# Patient Record
Sex: FEMALE | Race: OTHER | NOT HISPANIC OR LATINO | URBAN - METROPOLITAN AREA
[De-identification: names, ages, dates, MRNs, and addresses within clinical notes are randomized per-mention and may not be internally consistent; named-entity substitution may affect disease eponyms.]

---

## 2020-11-17 ENCOUNTER — OUTPATIENT (OUTPATIENT)
Dept: OUTPATIENT SERVICES | Facility: HOSPITAL | Age: 30
LOS: 1 days | Discharge: HOME | End: 2020-11-17
Payer: COMMERCIAL

## 2020-11-17 DIAGNOSIS — N64.52 NIPPLE DISCHARGE: ICD-10-CM

## 2020-11-17 PROCEDURE — 76641 ULTRASOUND BREAST COMPLETE: CPT | Mod: 26,50

## 2022-08-18 ENCOUNTER — APPOINTMENT (OUTPATIENT)
Dept: HUMAN REPRODUCTION | Facility: CLINIC | Age: 32
End: 2022-08-18

## 2022-08-18 PROCEDURE — 99204 OFFICE O/P NEW MOD 45 MIN: CPT | Mod: 25

## 2022-08-18 PROCEDURE — 76830 TRANSVAGINAL US NON-OB: CPT

## 2022-08-18 PROCEDURE — 36415 COLL VENOUS BLD VENIPUNCTURE: CPT

## 2022-09-01 ENCOUNTER — TRANSCRIPTION ENCOUNTER (OUTPATIENT)
Age: 32
End: 2022-09-01

## 2022-09-13 ENCOUNTER — APPOINTMENT (OUTPATIENT)
Dept: INTERNAL MEDICINE | Facility: CLINIC | Age: 32
End: 2022-09-13

## 2022-09-13 VITALS
DIASTOLIC BLOOD PRESSURE: 58 MMHG | HEART RATE: 79 BPM | WEIGHT: 129 LBS | TEMPERATURE: 98 F | OXYGEN SATURATION: 97 % | BODY MASS INDEX: 22.86 KG/M2 | SYSTOLIC BLOOD PRESSURE: 104 MMHG | HEIGHT: 63 IN

## 2022-09-13 DIAGNOSIS — Z83.3 FAMILY HISTORY OF DIABETES MELLITUS: ICD-10-CM

## 2022-09-13 DIAGNOSIS — K21.9 GASTRO-ESOPHAGEAL REFLUX DISEASE W/OUT ESOPHAGITIS: ICD-10-CM

## 2022-09-13 DIAGNOSIS — Z80.3 FAMILY HISTORY OF MALIGNANT NEOPLASM OF BREAST: ICD-10-CM

## 2022-09-13 PROCEDURE — 99385 PREV VISIT NEW AGE 18-39: CPT | Mod: 25

## 2022-09-13 PROCEDURE — 90686 IIV4 VACC NO PRSV 0.5 ML IM: CPT

## 2022-09-13 PROCEDURE — G0008: CPT

## 2022-09-13 RX ORDER — PNV NO.95/FERROUS FUM/FOLIC AC 28MG-0.8MG
TABLET ORAL
Refills: 0 | Status: ACTIVE | COMMUNITY

## 2022-09-13 NOTE — HISTORY OF PRESENT ILLNESS
[de-identified] : Ms. HOLLI CODY is a 31 year old female with history of acid reflux presenting today for CPE. She recently moved to [place] from  and is presented today to establish care. She had COVID this spring, but is otherwise in good health and denies any other recent illness. Last month she saw a fertility specialist as she is trying to get pregnant. Labs drawn at fertility visit reviewed and unremarkable.

## 2022-09-13 NOTE — ASSESSMENT
[FreeTextEntry1] : #HCM\par - check CMP today\par - recent CBC, TSH, A1c and titers unremarkable\par - recent negative STI screening\par - Tdap up to date\par - Flu vaccine given today\par - recommend bivalent COVID booster\par - discussed diet and exercise

## 2022-09-13 NOTE — HEALTH RISK ASSESSMENT
[Never] : Never [Yes] : Yes [2 - 3 times a week (3 pts)] : 2 - 3  times a week (3 points) [1 or 2 (0 pts)] : 1 or 2 (0 points) [Never (0 pts)] : Never (0 points) [No] : In the past 12 months have you used drugs other than those required for medical reasons? No [0] : 2) Feeling down, depressed, or hopeless: Not at all (0) [PHQ-2 Negative - No further assessment needed] : PHQ-2 Negative - No further assessment needed [Patient reported PAP Smear was normal] : Patient reported PAP Smear was normal [Fully functional (bathing, dressing, toileting, transferring, walking, feeding)] : Fully functional (bathing, dressing, toileting, transferring, walking, feeding) [Fully functional (using the telephone, shopping, preparing meals, housekeeping, doing laundry, using] : Fully functional and needs no help or supervision to perform IADLs (using the telephone, shopping, preparing meals, housekeeping, doing laundry, using transportation, managing medications and managing finances) [Audit-CScore] : 3 [de-identified] : running, exercise videos  [de-identified] : healthy diet, mostly plant based  [[id number]] : 0 [Reports changes in hearing] : Reports no changes in hearing [Reports changes in vision] : Reports no changes in vision [Reports changes in dental health] : Reports no changes in dental health [PapSmearDate] : 08/22

## 2022-09-14 ENCOUNTER — TRANSCRIPTION ENCOUNTER (OUTPATIENT)
Age: 32
End: 2022-09-14

## 2022-09-14 LAB
ALBUMIN SERPL ELPH-MCNC: 5.1 G/DL
ALP BLD-CCNC: 41 U/L
ALT SERPL-CCNC: 15 U/L
ANION GAP SERPL CALC-SCNC: 11 MMOL/L
AST SERPL-CCNC: 18 U/L
BILIRUB SERPL-MCNC: 0.5 MG/DL
BUN SERPL-MCNC: 10 MG/DL
CALCIUM SERPL-MCNC: 9.7 MG/DL
CHLORIDE SERPL-SCNC: 104 MMOL/L
CO2 SERPL-SCNC: 26 MMOL/L
CREAT SERPL-MCNC: 0.71 MG/DL
EGFR: 118 ML/MIN/1.73M2
GLUCOSE SERPL-MCNC: 97 MG/DL
POTASSIUM SERPL-SCNC: 4.7 MMOL/L
PROT SERPL-MCNC: 7.5 G/DL
SODIUM SERPL-SCNC: 141 MMOL/L

## 2022-12-06 ENCOUNTER — APPOINTMENT (OUTPATIENT)
Dept: HUMAN REPRODUCTION | Facility: CLINIC | Age: 32
End: 2022-12-06

## 2022-12-06 PROCEDURE — 99214 OFFICE O/P EST MOD 30 MIN: CPT

## 2023-01-19 ENCOUNTER — APPOINTMENT (OUTPATIENT)
Dept: OBGYN | Facility: CLINIC | Age: 33
End: 2023-01-19

## 2023-02-01 ENCOUNTER — OUTPATIENT (OUTPATIENT)
Dept: OUTPATIENT SERVICES | Facility: HOSPITAL | Age: 33
LOS: 1 days | End: 2023-02-01
Payer: COMMERCIAL

## 2023-02-01 ENCOUNTER — APPOINTMENT (OUTPATIENT)
Dept: RADIOLOGY | Facility: HOSPITAL | Age: 33
End: 2023-02-01
Payer: COMMERCIAL

## 2023-02-01 PROCEDURE — 74740 X-RAY FEMALE GENITAL TRACT: CPT | Mod: 26

## 2023-02-01 PROCEDURE — 74740 X-RAY FEMALE GENITAL TRACT: CPT

## 2023-02-01 PROCEDURE — 58340 CATHETER FOR HYSTEROGRAPHY: CPT

## 2023-05-08 ENCOUNTER — APPOINTMENT (OUTPATIENT)
Dept: HUMAN REPRODUCTION | Facility: CLINIC | Age: 33
End: 2023-05-08
Payer: COMMERCIAL

## 2023-05-08 PROCEDURE — 99214 OFFICE O/P EST MOD 30 MIN: CPT | Mod: 95

## 2023-05-12 ENCOUNTER — APPOINTMENT (OUTPATIENT)
Dept: HUMAN REPRODUCTION | Facility: CLINIC | Age: 33
End: 2023-05-12
Payer: COMMERCIAL

## 2023-05-12 PROCEDURE — 36415 COLL VENOUS BLD VENIPUNCTURE: CPT

## 2023-06-22 ENCOUNTER — APPOINTMENT (OUTPATIENT)
Dept: HUMAN REPRODUCTION | Facility: CLINIC | Age: 33
End: 2023-06-22
Payer: COMMERCIAL

## 2023-06-22 PROCEDURE — 99214 OFFICE O/P EST MOD 30 MIN: CPT | Mod: 95

## 2023-07-07 ENCOUNTER — APPOINTMENT (OUTPATIENT)
Dept: HUMAN REPRODUCTION | Facility: CLINIC | Age: 33
End: 2023-07-07
Payer: COMMERCIAL

## 2023-07-07 PROCEDURE — 76830 TRANSVAGINAL US NON-OB: CPT

## 2023-07-07 PROCEDURE — 36415 COLL VENOUS BLD VENIPUNCTURE: CPT

## 2023-07-11 ENCOUNTER — APPOINTMENT (OUTPATIENT)
Dept: HUMAN REPRODUCTION | Facility: CLINIC | Age: 33
End: 2023-07-11
Payer: COMMERCIAL

## 2023-07-11 PROCEDURE — 36415 COLL VENOUS BLD VENIPUNCTURE: CPT

## 2023-07-11 PROCEDURE — 99213 OFFICE O/P EST LOW 20 MIN: CPT | Mod: 25

## 2023-07-11 PROCEDURE — 76857 US EXAM PELVIC LIMITED: CPT

## 2023-07-13 ENCOUNTER — APPOINTMENT (OUTPATIENT)
Dept: HUMAN REPRODUCTION | Facility: CLINIC | Age: 33
End: 2023-07-13
Payer: COMMERCIAL

## 2023-07-13 PROCEDURE — 36415 COLL VENOUS BLD VENIPUNCTURE: CPT

## 2023-07-13 PROCEDURE — 76857 US EXAM PELVIC LIMITED: CPT

## 2023-07-13 PROCEDURE — 99213 OFFICE O/P EST LOW 20 MIN: CPT | Mod: 25

## 2023-07-15 ENCOUNTER — APPOINTMENT (OUTPATIENT)
Dept: HUMAN REPRODUCTION | Facility: CLINIC | Age: 33
End: 2023-07-15
Payer: COMMERCIAL

## 2023-07-15 ENCOUNTER — TRANSCRIPTION ENCOUNTER (OUTPATIENT)
Age: 33
End: 2023-07-15

## 2023-07-15 PROCEDURE — 36415 COLL VENOUS BLD VENIPUNCTURE: CPT

## 2023-07-15 PROCEDURE — 76857 US EXAM PELVIC LIMITED: CPT

## 2023-07-15 PROCEDURE — 99213 OFFICE O/P EST LOW 20 MIN: CPT | Mod: 25

## 2023-07-16 ENCOUNTER — APPOINTMENT (OUTPATIENT)
Dept: HUMAN REPRODUCTION | Facility: CLINIC | Age: 33
End: 2023-07-16

## 2023-07-16 ENCOUNTER — APPOINTMENT (OUTPATIENT)
Dept: HUMAN REPRODUCTION | Facility: CLINIC | Age: 33
End: 2023-07-16
Payer: COMMERCIAL

## 2023-07-16 PROCEDURE — 76857 US EXAM PELVIC LIMITED: CPT

## 2023-07-16 PROCEDURE — 36415 COLL VENOUS BLD VENIPUNCTURE: CPT

## 2023-07-16 PROCEDURE — 99213 OFFICE O/P EST LOW 20 MIN: CPT | Mod: 25

## 2023-07-17 ENCOUNTER — APPOINTMENT (OUTPATIENT)
Dept: HUMAN REPRODUCTION | Facility: CLINIC | Age: 33
End: 2023-07-17
Payer: COMMERCIAL

## 2023-07-17 PROCEDURE — 76857 US EXAM PELVIC LIMITED: CPT

## 2023-07-17 PROCEDURE — 36415 COLL VENOUS BLD VENIPUNCTURE: CPT

## 2023-07-17 PROCEDURE — 99213 OFFICE O/P EST LOW 20 MIN: CPT | Mod: 25

## 2023-07-18 ENCOUNTER — APPOINTMENT (OUTPATIENT)
Dept: HUMAN REPRODUCTION | Facility: CLINIC | Age: 33
End: 2023-07-18
Payer: COMMERCIAL

## 2023-07-18 PROCEDURE — 36415 COLL VENOUS BLD VENIPUNCTURE: CPT

## 2023-07-19 ENCOUNTER — APPOINTMENT (OUTPATIENT)
Dept: HUMAN REPRODUCTION | Facility: CLINIC | Age: 33
End: 2023-07-19
Payer: COMMERCIAL

## 2023-07-19 PROCEDURE — 89261 SPERM ISOLATION COMPLEX: CPT

## 2023-07-19 PROCEDURE — 89250 CULTR OOCYTE/EMBRYO <4 DAYS: CPT

## 2023-07-19 PROCEDURE — 76948 ECHO GUIDE OVA ASPIRATION: CPT

## 2023-07-19 PROCEDURE — 89254 OOCYTE IDENTIFICATION: CPT

## 2023-07-19 PROCEDURE — 58970 RETRIEVAL OF OOCYTE: CPT

## 2023-07-19 PROCEDURE — 89281 ASSIST OOCYTE FERTILIZATION: CPT

## 2023-07-20 ENCOUNTER — APPOINTMENT (OUTPATIENT)
Dept: HUMAN REPRODUCTION | Facility: CLINIC | Age: 33
End: 2023-07-20
Payer: COMMERCIAL

## 2023-07-23 PROCEDURE — 89253 EMBRYO HATCHING: CPT

## 2023-07-24 PROCEDURE — 89291 BIOPSY OOCYTE POLAR BODY: CPT

## 2023-07-24 PROCEDURE — 89258 CRYOPRESERVATION EMBRYO(S): CPT

## 2023-07-24 PROCEDURE — 89272 EXTENDED CULTURE OF OOCYTES: CPT

## 2023-07-24 PROCEDURE — 89290 BIOPSY OOCYTE POLAR BODY <=5: CPT

## 2023-07-24 PROCEDURE — 89342 STORAGE/YEAR EMBRYO(S): CPT

## 2023-09-01 ENCOUNTER — APPOINTMENT (OUTPATIENT)
Dept: HUMAN REPRODUCTION | Facility: CLINIC | Age: 33
End: 2023-09-01
Payer: COMMERCIAL

## 2023-09-01 PROCEDURE — 84702 CHORIONIC GONADOTROPIN TEST: CPT

## 2023-09-01 PROCEDURE — 36415 COLL VENOUS BLD VENIPUNCTURE: CPT

## 2023-09-03 ENCOUNTER — APPOINTMENT (OUTPATIENT)
Dept: HUMAN REPRODUCTION | Facility: CLINIC | Age: 33
End: 2023-09-03

## 2023-09-05 ENCOUNTER — APPOINTMENT (OUTPATIENT)
Dept: HUMAN REPRODUCTION | Facility: CLINIC | Age: 33
End: 2023-09-05
Payer: COMMERCIAL

## 2023-09-05 PROCEDURE — 58999I: CUSTOM

## 2023-09-05 PROCEDURE — 76831 ECHO EXAM UTERUS: CPT

## 2023-09-05 PROCEDURE — 58340 CATHETER FOR HYSTEROGRAPHY: CPT

## 2023-09-05 PROCEDURE — 99213 OFFICE O/P EST LOW 20 MIN: CPT | Mod: 25

## 2023-10-09 ENCOUNTER — APPOINTMENT (OUTPATIENT)
Dept: INTERNAL MEDICINE | Facility: CLINIC | Age: 33
End: 2023-10-09
Payer: COMMERCIAL

## 2023-10-09 VITALS
WEIGHT: 133 LBS | OXYGEN SATURATION: 97 % | TEMPERATURE: 97.1 F | HEART RATE: 69 BPM | SYSTOLIC BLOOD PRESSURE: 122 MMHG | DIASTOLIC BLOOD PRESSURE: 80 MMHG | BODY MASS INDEX: 23.57 KG/M2 | HEIGHT: 63 IN

## 2023-10-09 DIAGNOSIS — Z00.00 ENCOUNTER FOR GENERAL ADULT MEDICAL EXAMINATION W/OUT ABNORMAL FINDINGS: ICD-10-CM

## 2023-10-09 DIAGNOSIS — Z11.3 ENCOUNTER FOR SCREENING FOR INFECTIONS WITH A PREDOMINANTLY SEXUAL MODE OF TRANSMISSION: ICD-10-CM

## 2023-10-09 DIAGNOSIS — B00.1 HERPESVIRAL VESICULAR DERMATITIS: ICD-10-CM

## 2023-10-09 PROCEDURE — 99395 PREV VISIT EST AGE 18-39: CPT | Mod: 25

## 2023-10-09 PROCEDURE — 36415 COLL VENOUS BLD VENIPUNCTURE: CPT

## 2023-10-09 PROCEDURE — 90686 IIV4 VACC NO PRSV 0.5 ML IM: CPT

## 2023-10-09 PROCEDURE — G0008: CPT

## 2023-10-09 RX ORDER — VALACYCLOVIR 1 G/1
1 TABLET, FILM COATED ORAL
Qty: 30 | Refills: 3 | Status: ACTIVE | COMMUNITY
Start: 1900-01-01 | End: 1900-01-01

## 2023-10-10 ENCOUNTER — TRANSCRIPTION ENCOUNTER (OUTPATIENT)
Age: 33
End: 2023-10-10

## 2023-10-10 LAB
ALBUMIN SERPL ELPH-MCNC: 5 G/DL
ALP BLD-CCNC: 45 U/L
ALT SERPL-CCNC: 18 U/L
ANION GAP SERPL CALC-SCNC: 12 MMOL/L
AST SERPL-CCNC: 22 U/L
BASOPHILS # BLD AUTO: 0.06 K/UL
BASOPHILS NFR BLD AUTO: 1.1 %
BILIRUB SERPL-MCNC: 0.4 MG/DL
BUN SERPL-MCNC: 12 MG/DL
CALCIUM SERPL-MCNC: 9.8 MG/DL
CHLORIDE SERPL-SCNC: 104 MMOL/L
CHOLEST SERPL-MCNC: 195 MG/DL
CO2 SERPL-SCNC: 24 MMOL/L
CREAT SERPL-MCNC: 0.71 MG/DL
EGFR: 117 ML/MIN/1.73M2
EOSINOPHIL # BLD AUTO: 0.13 K/UL
EOSINOPHIL NFR BLD AUTO: 2.3 %
ESTIMATED AVERAGE GLUCOSE: 94 MG/DL
GLUCOSE SERPL-MCNC: 119 MG/DL
HBA1C MFR BLD HPLC: 4.9 %
HBV CORE IGG+IGM SER QL: NONREACTIVE
HBV SURFACE AB SER QL: NONREACTIVE
HBV SURFACE AG SER QL: NONREACTIVE
HCT VFR BLD CALC: 39.4 %
HCV AB SER QL: NONREACTIVE
HCV S/CO RATIO: 0.06 S/CO
HDLC SERPL-MCNC: 76 MG/DL
HGB BLD-MCNC: 12.9 G/DL
HIV1+2 AB SPEC QL IA.RAPID: NONREACTIVE
IMM GRANULOCYTES NFR BLD AUTO: 0.2 %
LDLC SERPL CALC-MCNC: 106 MG/DL
LYMPHOCYTES # BLD AUTO: 1.81 K/UL
LYMPHOCYTES NFR BLD AUTO: 32.5 %
MAN DIFF?: NORMAL
MCHC RBC-ENTMCNC: 29.3 PG
MCHC RBC-ENTMCNC: 32.7 GM/DL
MCV RBC AUTO: 89.5 FL
MONOCYTES # BLD AUTO: 0.39 K/UL
MONOCYTES NFR BLD AUTO: 7 %
NEUTROPHILS # BLD AUTO: 3.17 K/UL
NEUTROPHILS NFR BLD AUTO: 56.9 %
NONHDLC SERPL-MCNC: 119 MG/DL
PLATELET # BLD AUTO: 288 K/UL
POTASSIUM SERPL-SCNC: 4.5 MMOL/L
PROT SERPL-MCNC: 7.7 G/DL
RBC # BLD: 4.4 M/UL
RBC # FLD: 12.6 %
SODIUM SERPL-SCNC: 139 MMOL/L
T PALLIDUM AB SER QL IA: NEGATIVE
TRIGL SERPL-MCNC: 75 MG/DL
WBC # FLD AUTO: 5.57 K/UL

## 2023-10-16 ENCOUNTER — APPOINTMENT (OUTPATIENT)
Dept: INTERNAL MEDICINE | Facility: CLINIC | Age: 33
End: 2023-10-16
Payer: COMMERCIAL

## 2023-10-16 PROCEDURE — 90471 IMMUNIZATION ADMIN: CPT

## 2023-10-16 PROCEDURE — 90746 HEPB VACCINE 3 DOSE ADULT IM: CPT

## 2023-10-27 ENCOUNTER — APPOINTMENT (OUTPATIENT)
Dept: HUMAN REPRODUCTION | Facility: CLINIC | Age: 33
End: 2023-10-27
Payer: COMMERCIAL

## 2023-10-27 PROCEDURE — 76830 TRANSVAGINAL US NON-OB: CPT

## 2023-10-27 PROCEDURE — S4042: CPT

## 2023-10-27 PROCEDURE — 36415 COLL VENOUS BLD VENIPUNCTURE: CPT

## 2023-10-27 PROCEDURE — 84702 CHORIONIC GONADOTROPIN TEST: CPT

## 2023-10-27 PROCEDURE — 82670 ASSAY OF TOTAL ESTRADIOL: CPT

## 2023-10-27 PROCEDURE — 83002 ASSAY OF GONADOTROPIN (LH): CPT | Mod: QW

## 2023-10-27 PROCEDURE — 84144 ASSAY OF PROGESTERONE: CPT

## 2023-10-27 PROCEDURE — 83001 ASSAY OF GONADOTROPIN (FSH): CPT | Mod: QW

## 2023-11-03 ENCOUNTER — RESULT REVIEW (OUTPATIENT)
Age: 33
End: 2023-11-03

## 2023-11-03 ENCOUNTER — APPOINTMENT (OUTPATIENT)
Dept: HUMAN REPRODUCTION | Facility: CLINIC | Age: 33
End: 2023-11-03
Payer: COMMERCIAL

## 2023-11-03 PROCEDURE — 99213 OFFICE O/P EST LOW 20 MIN: CPT | Mod: 25

## 2023-11-03 PROCEDURE — 82670 ASSAY OF TOTAL ESTRADIOL: CPT

## 2023-11-03 PROCEDURE — 76857 US EXAM PELVIC LIMITED: CPT

## 2023-11-03 PROCEDURE — 84144 ASSAY OF PROGESTERONE: CPT

## 2023-11-03 PROCEDURE — 36415 COLL VENOUS BLD VENIPUNCTURE: CPT

## 2023-11-03 PROCEDURE — 83002 ASSAY OF GONADOTROPIN (LH): CPT | Mod: QW

## 2023-11-10 ENCOUNTER — APPOINTMENT (OUTPATIENT)
Dept: HUMAN REPRODUCTION | Facility: CLINIC | Age: 33
End: 2023-11-10
Payer: COMMERCIAL

## 2023-11-10 PROCEDURE — 84144 ASSAY OF PROGESTERONE: CPT

## 2023-11-10 PROCEDURE — 83002 ASSAY OF GONADOTROPIN (LH): CPT | Mod: QW

## 2023-11-10 PROCEDURE — 82670 ASSAY OF TOTAL ESTRADIOL: CPT

## 2023-11-10 PROCEDURE — 99213 OFFICE O/P EST LOW 20 MIN: CPT | Mod: 25

## 2023-11-10 PROCEDURE — 76857 US EXAM PELVIC LIMITED: CPT

## 2023-11-10 PROCEDURE — 36415 COLL VENOUS BLD VENIPUNCTURE: CPT

## 2023-11-14 ENCOUNTER — APPOINTMENT (OUTPATIENT)
Dept: HUMAN REPRODUCTION | Facility: CLINIC | Age: 33
End: 2023-11-14
Payer: COMMERCIAL

## 2023-11-14 PROCEDURE — 84144 ASSAY OF PROGESTERONE: CPT

## 2023-11-14 PROCEDURE — 76857 US EXAM PELVIC LIMITED: CPT

## 2023-11-14 PROCEDURE — 82670 ASSAY OF TOTAL ESTRADIOL: CPT

## 2023-11-14 PROCEDURE — 36415 COLL VENOUS BLD VENIPUNCTURE: CPT

## 2023-11-14 PROCEDURE — 83002 ASSAY OF GONADOTROPIN (LH): CPT | Mod: QW

## 2023-11-14 PROCEDURE — 99213 OFFICE O/P EST LOW 20 MIN: CPT | Mod: 25

## 2023-11-17 ENCOUNTER — APPOINTMENT (OUTPATIENT)
Dept: HUMAN REPRODUCTION | Facility: CLINIC | Age: 33
End: 2023-11-17
Payer: COMMERCIAL

## 2023-11-17 PROCEDURE — 83002 ASSAY OF GONADOTROPIN (LH): CPT | Mod: QW

## 2023-11-17 PROCEDURE — 99213 OFFICE O/P EST LOW 20 MIN: CPT | Mod: 25

## 2023-11-17 PROCEDURE — 76857 US EXAM PELVIC LIMITED: CPT

## 2023-11-17 PROCEDURE — 82670 ASSAY OF TOTAL ESTRADIOL: CPT

## 2023-11-17 PROCEDURE — 84144 ASSAY OF PROGESTERONE: CPT

## 2023-11-17 PROCEDURE — 36415 COLL VENOUS BLD VENIPUNCTURE: CPT

## 2023-11-22 ENCOUNTER — APPOINTMENT (OUTPATIENT)
Dept: HUMAN REPRODUCTION | Facility: CLINIC | Age: 33
End: 2023-11-22
Payer: COMMERCIAL

## 2023-11-22 PROCEDURE — 89352 THAWING CRYOPRESRVED EMBRYO: CPT

## 2023-11-22 PROCEDURE — 89398A: CUSTOM

## 2023-11-22 PROCEDURE — 89255 PREPARE EMBRYO FOR TRANSFER: CPT

## 2023-11-22 PROCEDURE — 58974 EMBRYO TRANSFER INTRAUTERINE: CPT

## 2023-11-22 PROCEDURE — 84144 ASSAY OF PROGESTERONE: CPT

## 2023-11-22 PROCEDURE — 76998 US GUIDE INTRAOP: CPT

## 2023-12-01 ENCOUNTER — APPOINTMENT (OUTPATIENT)
Dept: HUMAN REPRODUCTION | Facility: CLINIC | Age: 33
End: 2023-12-01
Payer: COMMERCIAL

## 2023-12-01 PROCEDURE — 84702 CHORIONIC GONADOTROPIN TEST: CPT

## 2023-12-01 PROCEDURE — 84144 ASSAY OF PROGESTERONE: CPT

## 2023-12-03 ENCOUNTER — APPOINTMENT (OUTPATIENT)
Dept: HUMAN REPRODUCTION | Facility: CLINIC | Age: 33
End: 2023-12-03
Payer: COMMERCIAL

## 2023-12-03 PROCEDURE — 36415 COLL VENOUS BLD VENIPUNCTURE: CPT

## 2023-12-08 ENCOUNTER — APPOINTMENT (OUTPATIENT)
Dept: HUMAN REPRODUCTION | Facility: CLINIC | Age: 33
End: 2023-12-08

## 2023-12-08 ENCOUNTER — APPOINTMENT (OUTPATIENT)
Dept: HUMAN REPRODUCTION | Facility: CLINIC | Age: 33
End: 2023-12-08
Payer: COMMERCIAL

## 2023-12-08 PROCEDURE — 99213 OFFICE O/P EST LOW 20 MIN: CPT | Mod: 25

## 2023-12-08 PROCEDURE — 76817 TRANSVAGINAL US OBSTETRIC: CPT

## 2023-12-08 PROCEDURE — 36415 COLL VENOUS BLD VENIPUNCTURE: CPT

## 2023-12-22 ENCOUNTER — APPOINTMENT (OUTPATIENT)
Dept: HUMAN REPRODUCTION | Facility: CLINIC | Age: 33
End: 2023-12-22
Payer: COMMERCIAL

## 2023-12-22 ENCOUNTER — APPOINTMENT (OUTPATIENT)
Dept: HUMAN REPRODUCTION | Facility: CLINIC | Age: 33
End: 2023-12-22

## 2023-12-22 PROCEDURE — 99213 OFFICE O/P EST LOW 20 MIN: CPT | Mod: 25

## 2023-12-22 PROCEDURE — 76817 TRANSVAGINAL US OBSTETRIC: CPT

## 2023-12-27 ENCOUNTER — APPOINTMENT (OUTPATIENT)
Dept: INTERNAL MEDICINE | Facility: CLINIC | Age: 33
End: 2023-12-27
Payer: COMMERCIAL

## 2023-12-27 DIAGNOSIS — Z23 ENCOUNTER FOR IMMUNIZATION: ICD-10-CM

## 2023-12-27 PROCEDURE — 90746 HEPB VACCINE 3 DOSE ADULT IM: CPT

## 2023-12-27 PROCEDURE — G0010: CPT

## 2024-01-30 ENCOUNTER — LABORATORY RESULT (OUTPATIENT)
Age: 34
End: 2024-01-30

## 2024-01-30 ENCOUNTER — APPOINTMENT (OUTPATIENT)
Dept: ANTEPARTUM | Facility: CLINIC | Age: 34
End: 2024-01-30
Payer: COMMERCIAL

## 2024-01-30 ENCOUNTER — APPOINTMENT (OUTPATIENT)
Dept: INTERNAL MEDICINE | Facility: CLINIC | Age: 34
End: 2024-01-30

## 2024-01-30 ENCOUNTER — ASOB RESULT (OUTPATIENT)
Age: 34
End: 2024-01-30

## 2024-01-30 PROCEDURE — 76813 OB US NUCHAL MEAS 1 GEST: CPT

## 2024-01-30 PROCEDURE — 36415 COLL VENOUS BLD VENIPUNCTURE: CPT

## 2024-01-30 PROCEDURE — 93976 VASCULAR STUDY: CPT

## 2024-03-01 ENCOUNTER — ASOB RESULT (OUTPATIENT)
Age: 34
End: 2024-03-01

## 2024-03-01 ENCOUNTER — APPOINTMENT (OUTPATIENT)
Dept: ANTEPARTUM | Facility: CLINIC | Age: 34
End: 2024-03-01
Payer: COMMERCIAL

## 2024-03-01 PROCEDURE — 76817 TRANSVAGINAL US OBSTETRIC: CPT

## 2024-03-01 PROCEDURE — 76805 OB US >/= 14 WKS SNGL FETUS: CPT

## 2024-04-03 ENCOUNTER — ASOB RESULT (OUTPATIENT)
Age: 34
End: 2024-04-03

## 2024-04-03 ENCOUNTER — APPOINTMENT (OUTPATIENT)
Dept: ANTEPARTUM | Facility: CLINIC | Age: 34
End: 2024-04-03
Payer: COMMERCIAL

## 2024-04-03 PROCEDURE — 76817 TRANSVAGINAL US OBSTETRIC: CPT

## 2024-04-03 PROCEDURE — 76811 OB US DETAILED SNGL FETUS: CPT

## 2024-04-11 ENCOUNTER — APPOINTMENT (OUTPATIENT)
Dept: HEPATOLOGY | Facility: CLINIC | Age: 34
End: 2024-04-11
Payer: COMMERCIAL

## 2024-04-11 VITALS
TEMPERATURE: 98.7 F | SYSTOLIC BLOOD PRESSURE: 108 MMHG | HEART RATE: 84 BPM | DIASTOLIC BLOOD PRESSURE: 69 MMHG | BODY MASS INDEX: 24.8 KG/M2 | HEIGHT: 63 IN | OXYGEN SATURATION: 96 % | WEIGHT: 140 LBS

## 2024-04-11 PROCEDURE — 99204 OFFICE O/P NEW MOD 45 MIN: CPT

## 2024-04-13 NOTE — PHYSICAL EXAM
[General Appearance - Alert] : alert [General Appearance - Well Nourished] : well nourished [General Appearance - Well Developed] : well developed [Sclera] : the sclera and conjunctiva were normal [PERRL With Normal Accommodation] : pupils were equal in size, round, and reactive to light [Extraocular Movements] : extraocular movements were intact [] : no respiratory distress [Exaggerated Use Of Accessory Muscles For Inspiration] : no accessory muscle use [Heart Rate And Rhythm] : heart rate was normal and rhythm regular [Abdomen Soft] : soft [Abdomen Tenderness] : non-tender [Abdomen Mass (___ Cm)] : no abdominal mass palpated [Abnormal Walk] : normal gait [Oriented To Time, Place, And Person] : oriented to person, place, and time [Impaired Insight] : insight and judgment were intact [Affect] : the affect was normal [Scleral Icterus] : No Scleral Icterus [Abdominal Bruit] : no abdominal bruit [Ascites Fluid Wave] : no ascites fluid wave [Asterixis] : no asterixis observed [Jaundice] : No jaundice

## 2024-04-13 NOTE — ASSESSMENT
[FreeTextEntry1] : 33F pregnant (2nd trimester) referred for evaluation of elevated liver tests.  ALT 50 Pregnancy in general should not lead to elevated liver enzymes. She had some elements of hyperemesis in the first trimester and I am wondering if this is the tail of liver test abnormality related to first trimester hyperemesis. However, she did not have frequent vomiting.  Mild pruritus. Check serum bile acid level to rule out cholestasis of pregnancy.  She asked whether the last dose of hepatitis B vaccination can be postponed to after delivery. HBV surface antibody is already positive and we can postpone the third dose.   Follow up in 6 weeks

## 2024-04-13 NOTE — HISTORY OF PRESENT ILLNESS
[Travel to Endemic Area] : travel to an endemic area [Needlestick Exposure] : no needlestick exposure [Infected Sexual Partner] : no infected sexual partner [IV Drug Use] : no IV drug use [Hemodialysis] : no hemodialysis [Transfusion before 1992] : no transfusion before 1992 [Alcohol Abuse] : no alcohol abuse [Household Contact to HBV] : no household contact to HBV [Occupational Exposure] : no occupational exposure [Cocaine Use] : no cocaine use [de-identified] : 33F without significant PMHx, pregnancy in 2nd trimester is referred to hepatology for evaluation and treatment of newly elevated LFTs.  Labs: Prior to below - normal LFTs  1/11/2024:  CMP: Na 139, K 4.2, BUN/Cr 12/0.59, Al 4.4, AST/ALT/Alkphos 49/85/63 CBC: WBC 10, HgB 12.3, Plt 321  03/23/2024 CMP: Na 135, K 4.3, BUN/Cr 12/0.72, Al 4.1, AST/ALT/Alkphos 43/58/50  Currently on hepatitis B series: 1st) 10/2023, 2nd) 11/2023, 3rd) pending  ETOH: No use since pregnancy - social 4-5 neil a week previously Smoking: Denies.   Marijuana; Tried previously. Denies continuous use.  Supplements: Prenatal vitamins, Omega-3 fish oil daily Occupation:  - .  Recent travel to [facility] 10 days in early December 2023  Medicines: Oocyte development and release: Gonal-F, cetrotide, menopur. Multiple injections of estrogen and progesterone for stabilization of pregnancy as pt was on IVF but not taking now. Claritin 10mg Q daily for allergies Valcylovir 1000mg PRN for cold sores - last used in December  Allergies: Cat dander and dust mites. NKDA  Pt reports feeling fine aside from new developing occasional pruritus. It is generally in and around her belly and does not affect her quality of life. She denies abdominal pain, dysphagia, weight loss, black or bloody stools. She reported nausea without vomiting in the first trimester but is improving now. She also started a new series of hepatitis B vaccines as documented above right before her pregnancy. She reports development of new brief periods of pruritus, but they don't affect quality of life. She otherwise has no complaints and feels well.

## 2024-04-15 LAB
ALBUMIN SERPL ELPH-MCNC: 4.2 G/DL
ALP BLD-CCNC: 54 U/L
ALT SERPL-CCNC: 51 U/L
ANION GAP SERPL CALC-SCNC: 11 MMOL/L
AST SERPL-CCNC: 34 U/L
BILIRUB SERPL-MCNC: 0.3 MG/DL
BUN SERPL-MCNC: 10 MG/DL
CALCIUM SERPL-MCNC: 9.4 MG/DL
CHLORIDE SERPL-SCNC: 104 MMOL/L
CO2 SERPL-SCNC: 24 MMOL/L
CREAT SERPL-MCNC: 0.63 MG/DL
EGFR: 122 ML/MIN/1.73M2
GLUCOSE SERPL-MCNC: 96 MG/DL
HBV SURFACE AB SERPL IA-ACNC: 164.6 MIU/ML
HCT VFR BLD CALC: 35.4 %
HGB BLD-MCNC: 11.7 G/DL
INR PPP: 0.91 RATIO
MCHC RBC-ENTMCNC: 30.2 PG
MCHC RBC-ENTMCNC: 33.1 GM/DL
MCV RBC AUTO: 91.2 FL
PLATELET # BLD AUTO: 279 K/UL
POTASSIUM SERPL-SCNC: 4.1 MMOL/L
PROT SERPL-MCNC: 6.8 G/DL
PT BLD: 10.3 SEC
RBC # BLD: 3.88 M/UL
RBC # FLD: 14.3 %
SODIUM SERPL-SCNC: 139 MMOL/L
WBC # FLD AUTO: 9.81 K/UL

## 2024-04-17 LAB — BILE AC SER-MCNC: 5.9 UMOL/L

## 2024-04-29 ENCOUNTER — APPOINTMENT (OUTPATIENT)
Dept: INTERNAL MEDICINE | Facility: CLINIC | Age: 34
End: 2024-04-29

## 2024-05-23 ENCOUNTER — APPOINTMENT (OUTPATIENT)
Dept: HEPATOLOGY | Facility: CLINIC | Age: 34
End: 2024-05-23
Payer: COMMERCIAL

## 2024-05-23 VITALS
SYSTOLIC BLOOD PRESSURE: 102 MMHG | WEIGHT: 144 LBS | DIASTOLIC BLOOD PRESSURE: 66 MMHG | RESPIRATION RATE: 16 BRPM | HEART RATE: 80 BPM | HEIGHT: 63 IN | TEMPERATURE: 98.5 F | OXYGEN SATURATION: 95 % | BODY MASS INDEX: 25.52 KG/M2

## 2024-05-23 DIAGNOSIS — R79.89 OTHER SPECIFIED ABNORMAL FINDINGS OF BLOOD CHEMISTRY: ICD-10-CM

## 2024-05-23 PROCEDURE — 99213 OFFICE O/P EST LOW 20 MIN: CPT

## 2024-05-23 NOTE — ASSESSMENT
[FreeTextEntry1] : Elevated liver tests while pregnant No evidence of major pregnancy related liver disease  Normal bile acid level, no pruritus  Repeat labs  Reassurance given  Repeat labs in 6 weeks

## 2024-05-23 NOTE — HISTORY OF PRESENT ILLNESS
[de-identified] : She is here for follow up No new complaints Patient feels ok No new event no NVD No rectal bleeding  No chest pain or SOB  No cough No dizziness or confusion No  symptoms   ROS otherwise negative   Meds reviewed  Labs and imaging reviewed   [FreeTextEntry1] : Stable VS Clear lungs  Regular heart sounds  Pregnant  No scleral icterus  No edema

## 2024-06-06 ENCOUNTER — NON-APPOINTMENT (OUTPATIENT)
Age: 34
End: 2024-06-06

## 2024-06-06 LAB
ALBUMIN SERPL ELPH-MCNC: 4.2 G/DL
ALP BLD-CCNC: 75 U/L
ALT SERPL-CCNC: 28 U/L
ANION GAP SERPL CALC-SCNC: 11 MMOL/L
AST SERPL-CCNC: 29 U/L
BILE AC SER-MCNC: 7.8 UMOL/L
BILIRUB SERPL-MCNC: 0.3 MG/DL
BUN SERPL-MCNC: 7 MG/DL
CALCIUM SERPL-MCNC: 9.4 MG/DL
CHLORIDE SERPL-SCNC: 104 MMOL/L
CO2 SERPL-SCNC: 22 MMOL/L
CREAT SERPL-MCNC: 0.66 MG/DL
EGFR: 120 ML/MIN/1.73M2
GLUCOSE SERPL-MCNC: 98 MG/DL
HCT VFR BLD CALC: 34.7 %
HGB BLD-MCNC: 11.8 G/DL
IRON SATN MFR SERPL: 21 %
IRON SERPL-MCNC: 90 UG/DL
MCHC RBC-ENTMCNC: 30.6 PG
MCHC RBC-ENTMCNC: 34 GM/DL
MCV RBC AUTO: 89.9 FL
PLATELET # BLD AUTO: 260 K/UL
POTASSIUM SERPL-SCNC: 4.7 MMOL/L
PROT SERPL-MCNC: 6.6 G/DL
RBC # BLD: 3.86 M/UL
RBC # FLD: 13.3 %
SODIUM SERPL-SCNC: 137 MMOL/L
TIBC SERPL-MCNC: 426 UG/DL
TTG IGA SER IA-ACNC: <1.2 U/ML
TTG IGA SER-ACNC: NEGATIVE
TTG IGG SER IA-ACNC: 2 U/ML
TTG IGG SER IA-ACNC: NEGATIVE
UIBC SERPL-MCNC: 336 UG/DL
WBC # FLD AUTO: 9.19 K/UL

## 2024-06-11 ENCOUNTER — ASOB RESULT (OUTPATIENT)
Age: 34
End: 2024-06-11

## 2024-06-11 ENCOUNTER — APPOINTMENT (OUTPATIENT)
Dept: ANTEPARTUM | Facility: CLINIC | Age: 34
End: 2024-06-11
Payer: COMMERCIAL

## 2024-06-11 PROCEDURE — 76816 OB US FOLLOW-UP PER FETUS: CPT

## 2024-06-11 PROCEDURE — 76820 UMBILICAL ARTERY ECHO: CPT | Mod: 59

## 2024-06-11 PROCEDURE — 76819 FETAL BIOPHYS PROFIL W/O NST: CPT

## 2024-07-08 ENCOUNTER — APPOINTMENT (OUTPATIENT)
Dept: HEPATOLOGY | Facility: CLINIC | Age: 34
End: 2024-07-08

## 2024-07-08 ENCOUNTER — APPOINTMENT (OUTPATIENT)
Dept: ANTEPARTUM | Facility: CLINIC | Age: 34
End: 2024-07-08
Payer: COMMERCIAL

## 2024-07-08 ENCOUNTER — ASOB RESULT (OUTPATIENT)
Age: 34
End: 2024-07-08

## 2024-07-08 PROCEDURE — 76816 OB US FOLLOW-UP PER FETUS: CPT

## 2024-07-08 PROCEDURE — 76819 FETAL BIOPHYS PROFIL W/O NST: CPT

## 2024-07-08 PROCEDURE — 76820 UMBILICAL ARTERY ECHO: CPT | Mod: 59

## 2024-07-30 ENCOUNTER — OUTPATIENT (OUTPATIENT)
Dept: OUTPATIENT SERVICES | Facility: HOSPITAL | Age: 34
LOS: 1 days | End: 2024-07-30
Payer: COMMERCIAL

## 2024-07-30 VITALS
SYSTOLIC BLOOD PRESSURE: 115 MMHG | HEART RATE: 95 BPM | RESPIRATION RATE: 18 BRPM | TEMPERATURE: 98 F | DIASTOLIC BLOOD PRESSURE: 79 MMHG

## 2024-07-30 DIAGNOSIS — Z90.89 ACQUIRED ABSENCE OF OTHER ORGANS: Chronic | ICD-10-CM

## 2024-07-30 DIAGNOSIS — O26.899 OTHER SPECIFIED PREGNANCY RELATED CONDITIONS, UNSPECIFIED TRIMESTER: ICD-10-CM

## 2024-07-30 LAB
ALBUMIN SERPL ELPH-MCNC: 3.8 G/DL — SIGNIFICANT CHANGE UP (ref 3.3–5)
ALP SERPL-CCNC: 166 U/L — HIGH (ref 40–120)
ALT FLD-CCNC: 23 U/L — SIGNIFICANT CHANGE UP (ref 10–45)
ANION GAP SERPL CALC-SCNC: 10 MMOL/L — SIGNIFICANT CHANGE UP (ref 5–17)
APPEARANCE UR: CLEAR — SIGNIFICANT CHANGE UP
APTT BLD: 25.4 SEC — SIGNIFICANT CHANGE UP (ref 24.5–35.6)
AST SERPL-CCNC: 30 U/L — SIGNIFICANT CHANGE UP (ref 10–40)
BACTERIA # UR AUTO: NEGATIVE /HPF — SIGNIFICANT CHANGE UP
BASOPHILS # BLD AUTO: 0.04 K/UL — SIGNIFICANT CHANGE UP (ref 0–0.2)
BASOPHILS NFR BLD AUTO: 0.4 % — SIGNIFICANT CHANGE UP (ref 0–2)
BILIRUB SERPL-MCNC: 0.4 MG/DL — SIGNIFICANT CHANGE UP (ref 0.2–1.2)
BILIRUB UR-MCNC: NEGATIVE — SIGNIFICANT CHANGE UP
BUN SERPL-MCNC: 6 MG/DL — LOW (ref 7–23)
CALCIUM SERPL-MCNC: 9.1 MG/DL — SIGNIFICANT CHANGE UP (ref 8.4–10.5)
CAST: 0 /LPF — SIGNIFICANT CHANGE UP (ref 0–4)
CHLORIDE SERPL-SCNC: 103 MMOL/L — SIGNIFICANT CHANGE UP (ref 96–108)
CO2 SERPL-SCNC: 21 MMOL/L — LOW (ref 22–31)
COLOR SPEC: YELLOW — SIGNIFICANT CHANGE UP
CREAT ?TM UR-MCNC: 45 MG/DL — SIGNIFICANT CHANGE UP
CREAT SERPL-MCNC: 0.62 MG/DL — SIGNIFICANT CHANGE UP (ref 0.5–1.3)
DIFF PNL FLD: NEGATIVE — SIGNIFICANT CHANGE UP
EGFR: 122 ML/MIN/1.73M2 — SIGNIFICANT CHANGE UP
EOSINOPHIL # BLD AUTO: 0.06 K/UL — SIGNIFICANT CHANGE UP (ref 0–0.5)
EOSINOPHIL NFR BLD AUTO: 0.5 % — SIGNIFICANT CHANGE UP (ref 0–6)
FIBRINOGEN PPP-MCNC: 474 MG/DL — HIGH (ref 200–445)
GLUCOSE SERPL-MCNC: 91 MG/DL — SIGNIFICANT CHANGE UP (ref 70–99)
GLUCOSE UR QL: NEGATIVE MG/DL — SIGNIFICANT CHANGE UP
HCT VFR BLD CALC: 33.6 % — LOW (ref 34.5–45)
HGB BLD-MCNC: 11.9 G/DL — SIGNIFICANT CHANGE UP (ref 11.5–15.5)
IMM GRANULOCYTES NFR BLD AUTO: 0.5 % — SIGNIFICANT CHANGE UP (ref 0–0.9)
INR BLD: 0.84 — LOW (ref 0.85–1.18)
KETONES UR-MCNC: NEGATIVE MG/DL — SIGNIFICANT CHANGE UP
LDH SERPL L TO P-CCNC: 191 U/L — SIGNIFICANT CHANGE UP (ref 50–242)
LEUKOCYTE ESTERASE UR-ACNC: ABNORMAL
LYMPHOCYTES # BLD AUTO: 1.91 K/UL — SIGNIFICANT CHANGE UP (ref 1–3.3)
LYMPHOCYTES # BLD AUTO: 17.2 % — SIGNIFICANT CHANGE UP (ref 13–44)
MCHC RBC-ENTMCNC: 30.7 PG — SIGNIFICANT CHANGE UP (ref 27–34)
MCHC RBC-ENTMCNC: 35.4 GM/DL — SIGNIFICANT CHANGE UP (ref 32–36)
MCV RBC AUTO: 86.8 FL — SIGNIFICANT CHANGE UP (ref 80–100)
MONOCYTES # BLD AUTO: 0.8 K/UL — SIGNIFICANT CHANGE UP (ref 0–0.9)
MONOCYTES NFR BLD AUTO: 7.2 % — SIGNIFICANT CHANGE UP (ref 2–14)
NEUTROPHILS # BLD AUTO: 8.26 K/UL — HIGH (ref 1.8–7.4)
NEUTROPHILS NFR BLD AUTO: 74.2 % — SIGNIFICANT CHANGE UP (ref 43–77)
NITRITE UR-MCNC: NEGATIVE — SIGNIFICANT CHANGE UP
NRBC # BLD: 0 /100 WBCS — SIGNIFICANT CHANGE UP (ref 0–0)
PH UR: 7.5 — SIGNIFICANT CHANGE UP (ref 5–8)
PLATELET # BLD AUTO: 214 K/UL — SIGNIFICANT CHANGE UP (ref 150–400)
POTASSIUM SERPL-MCNC: 3.7 MMOL/L — SIGNIFICANT CHANGE UP (ref 3.5–5.3)
POTASSIUM SERPL-SCNC: 3.7 MMOL/L — SIGNIFICANT CHANGE UP (ref 3.5–5.3)
PROT ?TM UR-MCNC: 8 MG/DL — SIGNIFICANT CHANGE UP (ref 0–12)
PROT SERPL-MCNC: 6.9 G/DL — SIGNIFICANT CHANGE UP (ref 6–8.3)
PROT UR-MCNC: NEGATIVE MG/DL — SIGNIFICANT CHANGE UP
PROT/CREAT UR-RTO: 0.2 RATIO — SIGNIFICANT CHANGE UP (ref 0–0.2)
PROTHROM AB SERPL-ACNC: 9.6 SEC — SIGNIFICANT CHANGE UP (ref 9.5–13)
RBC # BLD: 3.87 M/UL — SIGNIFICANT CHANGE UP (ref 3.8–5.2)
RBC # FLD: 13.4 % — SIGNIFICANT CHANGE UP (ref 10.3–14.5)
RBC CASTS # UR COMP ASSIST: 0 /HPF — SIGNIFICANT CHANGE UP (ref 0–4)
SODIUM SERPL-SCNC: 134 MMOL/L — LOW (ref 135–145)
SP GR SPEC: 1.01 — SIGNIFICANT CHANGE UP (ref 1–1.03)
SQUAMOUS # UR AUTO: 1 /HPF — SIGNIFICANT CHANGE UP (ref 0–5)
URATE SERPL-MCNC: 3.9 MG/DL — SIGNIFICANT CHANGE UP (ref 2.5–7)
UROBILINOGEN FLD QL: 0.2 MG/DL — SIGNIFICANT CHANGE UP (ref 0.2–1)
WBC # BLD: 11.13 K/UL — HIGH (ref 3.8–10.5)
WBC # FLD AUTO: 11.13 K/UL — HIGH (ref 3.8–10.5)
WBC UR QL: 0 /HPF — SIGNIFICANT CHANGE UP (ref 0–5)

## 2024-07-30 PROCEDURE — 59025 FETAL NON-STRESS TEST: CPT | Mod: 26,59

## 2024-07-30 PROCEDURE — 85730 THROMBOPLASTIN TIME PARTIAL: CPT

## 2024-07-30 PROCEDURE — 76815 OB US LIMITED FETUS(S): CPT | Mod: 26

## 2024-07-30 PROCEDURE — 99221 1ST HOSP IP/OBS SF/LOW 40: CPT | Mod: 25

## 2024-07-30 PROCEDURE — 76815 OB US LIMITED FETUS(S): CPT

## 2024-07-30 PROCEDURE — 76818 FETAL BIOPHYS PROFILE W/NST: CPT | Mod: 26

## 2024-07-30 PROCEDURE — 81001 URINALYSIS AUTO W/SCOPE: CPT

## 2024-07-30 PROCEDURE — 82570 ASSAY OF URINE CREATININE: CPT

## 2024-07-30 PROCEDURE — 84156 ASSAY OF PROTEIN URINE: CPT

## 2024-07-30 PROCEDURE — 85025 COMPLETE CBC W/AUTO DIFF WBC: CPT

## 2024-07-30 PROCEDURE — 99214 OFFICE O/P EST MOD 30 MIN: CPT

## 2024-07-30 PROCEDURE — 76818 FETAL BIOPHYS PROFILE W/NST: CPT

## 2024-07-30 PROCEDURE — 84550 ASSAY OF BLOOD/URIC ACID: CPT

## 2024-07-30 PROCEDURE — 59025 FETAL NON-STRESS TEST: CPT

## 2024-07-30 PROCEDURE — 36415 COLL VENOUS BLD VENIPUNCTURE: CPT

## 2024-07-30 PROCEDURE — 85610 PROTHROMBIN TIME: CPT

## 2024-07-30 PROCEDURE — 80053 COMPREHEN METABOLIC PANEL: CPT

## 2024-07-30 PROCEDURE — 83615 LACTATE (LD) (LDH) ENZYME: CPT

## 2024-07-30 PROCEDURE — 85384 FIBRINOGEN ACTIVITY: CPT

## 2024-07-30 NOTE — OB RN TRIAGE NOTE - FALL HARM RISK - UNIVERSAL INTERVENTIONS
Bed in lowest position, wheels locked, appropriate side rails in place/Call bell, personal items and telephone in reach/Instruct patient to call for assistance before getting out of bed or chair/Non-slip footwear when patient is out of bed/[place] to call system/Physically safe environment - no spills, clutter or unnecessary equipment/Purposeful Proactive Rounding/Room/bathroom lighting operational, light cord in reach

## 2024-07-30 NOTE — OB PROVIDER TRIAGE NOTE - HISTORY OF PRESENT ILLNESS
HPI: 32 yo  at 38w4d presenting from the office with an elevated BP of 150/85 at 15:00 today. Patient states that this is her first episode of elevated blood pressures during this pregnancy and has never completed a 24 hr urine. Endorses FM. Denies VB, LOF, ctx, visual changes, HA, RUQ pain, SOB/CP, or worsening b/l LE edema.    ANTE: IVF pregnancy, own egg. First mild range BP today . NIPT and anatomy scan WNL. GCT passed. GBS negative/positive. Denies thyroid disorders. EFW 3175g today in office.    OB: G1 - SAB. G2 - current.  GynHx: Uterine fibroid 1.1x0.8x1.15cm. Denies ovarian cysts, STI's, or abnormal PAP.  PMHx: Denies  PSurgHx: Denies  Medications: PNV  ALL: NKDA    BP:   HR:  Gen: NAD, A&Ox3  Abd: non-tender, gravid  LE: no swelling or pitting edema  Skin: no excoriations or rashes  Pulm: no increased WOB  SVE:    FHT: Cat 1,  bpm, moderate variability, + accels, - decels.  [place]: Tameka q  TAUS: Cephalic, anterior/posterior placenta. U/S printed and attached to paper chart.     A/P: 32 yo  at 38w4d presenting for r/o gHTN vs PEC w/o SF.   - BPP  - NST reactive and reassuring  - Full labs pending  - Monitor BP's q15 minutes HPI: 34 yo  at 38w4d presenting from the office with an elevated BP of 150/85 at 15:00 today. Patient states that this is her first episode of elevated blood pressures during this pregnancy and has never completed a 24 hr urine. She admits to a 3/10 frontal HA this morning that was relived on it's own, did not take Tylenol for relief. Endorses FM. Denies VB, LOF, ctx, visual changes, RUQ pain, SOB/CP, or worsening b/l LE edema.    ANTE: IVF pregnancy, own egg. First mild range BP today 150/85 on . NIPT and anatomy scan WNL. GCT passed. GBS negative. Denies thyroid disorders. EFW 3175g today in office.    OB: G1 - SAB , G2 - SAB . G2 - current.  GynHx: Uterine fibroid 1.1x0.8x1.15cm. Denies ovarian cysts, STI's, or abnormal PAP.  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  [place]: Tameka q  TAUS: Breech, anterior/posterior placenta. U/S printed and attached to paper chart.     A/P: 34 yo  at 38w4d presenting for r/o gHTN vs PEC w/o SF.   - BPP  - NST reactive and reassuring  - Full labs pending  - Monitor BP's q15 minutes HPI: 34 yo  at 38w4d presenting from the office with an elevated BP of 150/85 at 15:00 today. Patient states that this is her first episode of elevated blood pressures during this pregnancy and has never completed a 24 hr urine. She admits to a 3/10 frontal HA this morning that was relived on it's own, did not take Tylenol for relief. Endorses FM. Denies VB, LOF, ctx, visual changes, RUQ pain, SOB/CP, or worsening b/l LE edema.    ANTE: IVF pregnancy, own egg. First mild range BP today 150/85 on . NIPT and anatomy scan WNL. GCT passed. GBS negative. Denies thyroid disorders. EFW 3175g today in office.    OB: G1 - SAB , G2 - SAB . G2 - current.  GynHx: Uterine fibroid 1.1x0.8x1.15cm. Denies ovarian cysts, STI's, or abnormal PAP.  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  [place]: Tameka 1 in 10 minutes on toco.  TAUS: Breech, spine to maternal left, posterior placenta. BPP 8/8, TONE 11 cm.     A/P: 34 yo  at 38w4d presenting for r/o gHTN vs PEC w/o SF.   - BPP 8/8  - NST reactive and reassuring, a single variable vs late decel noted on first triage assessment, no toco at the time, will continue to monitor  - Full labs pending  - Monitor BP's q15 minutes HPI: 34 yo  at 38w4d presenting from the office with an elevated BP of 150/85 at 15:00 today. Patient states that this is her first episode of elevated blood pressures during this pregnancy and has never completed a 24 hr urine. She admits to a 3/10 frontal HA this morning that was relived on it's own, did not take Tylenol for relief. Endorses FM. Denies VB, LOF, ctx, visual changes, RUQ pain, SOB/CP, or worsening b/l LE edema.    ANTE: IVF pregnancy, own egg. First mild range BP today 150/85 on . NIPT and anatomy scan WNL. GCT passed. GBS negative. Denies thyroid disorders. EFW 3175g today in office.    OB: G1 - SAB , G2 - SAB . G2 - current.  GynHx: Uterine fibroid 1.1x0.8x1.15cm. Denies ovarian cysts, STI's, or abnormal PAP.  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  [place]: Tameka 1 in 10 minutes on toco, pt does not feel them  TAUS: Breech, spine to maternal left, posterior placenta. BPP 8/8, TONE 11 cm.     A/P: 34 yo  at 38w4d presenting for r/o gHTN vs PEC w/o SF.   - BPP 8/8  - NST reactive and reassuring, a single variable vs late decel noted on first triage assessment, no toco at the time, will continue to monitor  - Full labs pending  - Monitor BP's q15 minutes HPI: 34 yo  at 38w4d presenting from the office with an elevated BP of 150/85 at 15:00 today. Patient states that this is her first episode of elevated blood pressures during this pregnancy and has never completed a 24 hr urine. She admits to a 3/10 frontal HA this morning that was relived on it's own, did not take Tylenol for relief. Endorses FM. Denies VB, LOF, ctx, visual changes, RUQ pain, SOB/CP, or worsening b/l LE edema.    ANTE: IVF pregnancy, own egg. First mild range BP today 150/85 on . NIPT and anatomy scan WNL. GCT passed. GBS negative. Denies thyroid disorders. EFW 3175g today in office.    OB: G1 - SAB , G2 - SAB . G2 - current.  GynHx: Uterine fibroid 1.1x0.8x1.15cm. Denies ovarian cysts, STI's, or abnormal PAP.  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  [place]: Tameka 1 in 10 minutes on toco, pt does not feel them  TAUS: Breech, spine to maternal left, posterior placenta. BPP 8/8, TONE 11 cm.     A/P: 34 yo  at 38w4d presenting for r/o gHTN vs PEC w/o SF.   - BPP 8/8  - NST reactive and reassuring, a single variable vs late decel noted on first triage assessment, no toco at the time, will continue to monitor  - Full labs: P:C 0.2, Cr: 0.62, AST/ALT 30/, plt 214  - Monitor BP's q15 minutes, normotensive  - Plan: Continued FHT reactive and reassuring with accels and moderate variability. Vitals are stable, patient is normotensive. Labs are back and WNL. Patient denies toxic complaints at this time, SVE 1/long. Patient ruled out for EL and for gHTN at this time. Has a scheduled c/s for breech presentation on . Return precautions discussed including but not limited to VB, LOF, ctx, decreased FM, HA, visual changes, RUQ pain, CP, or SOB.  HPI: 32 yo  at 38w4d presenting from the office with an elevated BP of 150/85 at 15:00 today. Patient states that this is her first episode of elevated blood pressures during this pregnancy and has never completed a 24 hr urine. She admits to a 3/10 frontal HA this morning that was relived on it's own, did not take Tylenol for relief. Endorses FM. Denies VB, LOF, ctx, visual changes, RUQ pain, SOB/CP, or worsening b/l LE edema.    ANTE: IVF pregnancy, own egg. First mild range BP today 150/85 on . NIPT and anatomy scan WNL. GCT passed. GBS negative. Denies thyroid disorders. EFW 3175g today in office.    OB: G1 - SAB , G2 - SAB . G2 - current.  GynHx: Uterine fibroid 1.1x0.8x1.15cm. Denies ovarian cysts, STI's, or abnormal PAP.  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  [place]: Tameka 1 in 10 minutes on toco, pt does not feel them  TAUS: Breech, spine to maternal left, posterior placenta. BPP 8/8, TONE 11 cm. U/S printed and attached to paper chart.    A/P: 32 yo  at 38w4d presenting for r/o gHTN vs PEC w/o SF.   - BPP 8/8  - NST reactive and reassuring, a single variable vs late decel noted on first triage assessment, no toco at the time, will continue to monitor  - Full labs: P:C 0.2, Cr: 0.62, AST/ALT 30/23, plt 214  - Monitor BP's q15 minutes, normotensive  - Plan: Continued FHT reactive and reassuring with accels and moderate variability. Vitals are stable, patient is normotensive. Labs are back and WNL. Patient denies toxic complaints at this time, SVE 1/long. Patient ruled out for EL and for gHTN at this time. Has a scheduled c/s for breech presentation on . Return precautions discussed including but not limited to VB, LOF, ctx, decreased FM, HA, visual changes, RUQ pain, CP, or SOB.     D/W Dr. Paul Acosta PA-C

## 2024-07-30 NOTE — OB RN TRIAGE NOTE - LIVING CHILDREN, OB PROFILE
01/18/21        Scott Wegener  [address]   [facility] [place] [address]      Dear Mark Tristan,    [facility] records indicate that you have outstanding lab work and or testing that was ordered for you and has not yet been completed:  Fasting Lab Work   To keep our p 0

## 2024-08-01 ENCOUNTER — TRANSCRIPTION ENCOUNTER (OUTPATIENT)
Age: 34
End: 2024-08-01

## 2024-08-01 DIAGNOSIS — O09.813 SUPERVISION OF PREGNANCY RESULTING FROM ASSISTED REPRODUCTIVE TECHNOLOGY, THIRD TRIMESTER: ICD-10-CM

## 2024-08-01 DIAGNOSIS — D25.9 LEIOMYOMA OF UTERUS, UNSPECIFIED: ICD-10-CM

## 2024-08-01 DIAGNOSIS — O09.293 SUPERVISION OF PREGNANCY WITH OTHER POOR REPRODUCTIVE OR OBSTETRIC HISTORY, THIRD TRIMESTER: ICD-10-CM

## 2024-08-01 DIAGNOSIS — O34.13 MATERNAL CARE FOR BENIGN TUMOR OF CORPUS UTERI, THIRD TRIMESTER: ICD-10-CM

## 2024-08-01 DIAGNOSIS — Z3A.38 38 WEEKS GESTATION OF PREGNANCY: ICD-10-CM

## 2024-08-01 DIAGNOSIS — O16.3 UNSPECIFIED MATERNAL HYPERTENSION, THIRD TRIMESTER: ICD-10-CM

## 2024-08-02 ENCOUNTER — INPATIENT (INPATIENT)
Facility: HOSPITAL | Age: 34
LOS: 1 days | Discharge: HOME | DRG: 833 | End: 2024-08-04
Attending: OBSTETRICS & GYNECOLOGY | Admitting: OBSTETRICS & GYNECOLOGY
Payer: COMMERCIAL

## 2024-08-02 VITALS
SYSTOLIC BLOOD PRESSURE: 124 MMHG | DIASTOLIC BLOOD PRESSURE: 80 MMHG | HEART RATE: 97 BPM | OXYGEN SATURATION: 94 % | TEMPERATURE: 98 F | RESPIRATION RATE: 16 BRPM

## 2024-08-02 DIAGNOSIS — Z90.89 ACQUIRED ABSENCE OF OTHER ORGANS: Chronic | ICD-10-CM

## 2024-08-02 DIAGNOSIS — O26.899 OTHER SPECIFIED PREGNANCY RELATED CONDITIONS, UNSPECIFIED TRIMESTER: ICD-10-CM

## 2024-08-02 PROBLEM — Z78.9 OTHER SPECIFIED HEALTH STATUS: Chronic | Status: ACTIVE | Noted: 2024-07-30

## 2024-08-02 LAB
BASOPHILS # BLD AUTO: 0.05 K/UL — SIGNIFICANT CHANGE UP (ref 0–0.2)
BASOPHILS NFR BLD AUTO: 0.4 % — SIGNIFICANT CHANGE UP (ref 0–2)
BLD GP AB SCN SERPL QL: POSITIVE — SIGNIFICANT CHANGE UP
EOSINOPHIL # BLD AUTO: 0.07 K/UL — SIGNIFICANT CHANGE UP (ref 0–0.5)
EOSINOPHIL NFR BLD AUTO: 0.5 % — SIGNIFICANT CHANGE UP (ref 0–6)
HCT VFR BLD CALC: 36.7 % — SIGNIFICANT CHANGE UP (ref 34.5–45)
HGB BLD-MCNC: 12.6 G/DL — SIGNIFICANT CHANGE UP (ref 11.5–15.5)
IMM GRANULOCYTES NFR BLD AUTO: 0.6 % — SIGNIFICANT CHANGE UP (ref 0–0.9)
KLEIHAUER-BETKE CALCULATION: 0 % — SIGNIFICANT CHANGE UP (ref 0–0.2)
LYMPHOCYTES # BLD AUTO: 19.3 % — SIGNIFICANT CHANGE UP (ref 13–44)
LYMPHOCYTES # BLD AUTO: 2.54 K/UL — SIGNIFICANT CHANGE UP (ref 1–3.3)
MCHC RBC-ENTMCNC: 30.2 PG — SIGNIFICANT CHANGE UP (ref 27–34)
MCHC RBC-ENTMCNC: 34.3 GM/DL — SIGNIFICANT CHANGE UP (ref 32–36)
MCV RBC AUTO: 88 FL — SIGNIFICANT CHANGE UP (ref 80–100)
MONOCYTES # BLD AUTO: 1.02 K/UL — HIGH (ref 0–0.9)
MONOCYTES NFR BLD AUTO: 7.8 % — SIGNIFICANT CHANGE UP (ref 2–14)
NEUTROPHILS # BLD AUTO: 9.39 K/UL — HIGH (ref 1.8–7.4)
NEUTROPHILS NFR BLD AUTO: 71.4 % — SIGNIFICANT CHANGE UP (ref 43–77)
NRBC # BLD: 0 /100 WBCS — SIGNIFICANT CHANGE UP (ref 0–0)
PLATELET # BLD AUTO: 219 K/UL — SIGNIFICANT CHANGE UP (ref 150–400)
RBC # BLD: 4.17 M/UL — SIGNIFICANT CHANGE UP (ref 3.8–5.2)
RBC # FLD: 13.2 % — SIGNIFICANT CHANGE UP (ref 10.3–14.5)
RH IG SCN BLD-IMP: NEGATIVE — SIGNIFICANT CHANGE UP
RH IG SCN BLD-IMP: NEGATIVE — SIGNIFICANT CHANGE UP
T PALLIDUM AB TITR SER: NEGATIVE — SIGNIFICANT CHANGE UP
WBC # BLD: 13.15 K/UL — HIGH (ref 3.8–10.5)
WBC # FLD AUTO: 13.15 K/UL — HIGH (ref 3.8–10.5)

## 2024-08-02 PROCEDURE — 86077 PHYS BLOOD BANK SERV XMATCH: CPT

## 2024-08-02 RX ORDER — ENOXAPARIN SODIUM 120 MG/.8ML
40 INJECTION SUBCUTANEOUS EVERY 24 HOURS
Refills: 0 | Status: DISCONTINUED | OUTPATIENT
Start: 2024-08-02 | End: 2024-08-04

## 2024-08-02 RX ORDER — CLOSTRIDIUM TETANI TOXOID ANTIGEN (FORMALDEHYDE INACTIVATED), CORYNEBACTERIUM DIPHTHERIAE TOXOID ANTIGEN (FORMALDEHYDE INACTIVATED), BORDETELLA PERTUSSIS TOXOID ANTIGEN (GLUTARALDEHYDE INACTIVATED), BORDETELLA PERTUSSIS FILAMENTOUS HEMAGGLUTININ ANTIGEN (FORMALDEHYDE INACTIVATED), BORDETELLA PERTUSSIS PERTACTIN ANTIGEN, AND BORDETELLA PERTUSSIS FIMBRIAE 2/3 ANTIGEN 5; 2; 2.5; 5; 3; 5 [LF]/.5ML; [LF]/.5ML; UG/.5ML; UG/.5ML; UG/.5ML; UG/.5ML
0.5 INJECTION, SUSPENSION INTRAMUSCULAR ONCE
Refills: 0 | Status: DISCONTINUED | OUTPATIENT
Start: 2024-08-02 | End: 2024-08-04

## 2024-08-02 RX ORDER — DEXAMETHASONE 1.5 MG/1
4 TABLET ORAL EVERY 6 HOURS
Refills: 0 | Status: DISCONTINUED | OUTPATIENT
Start: 2024-08-02 | End: 2024-08-02

## 2024-08-02 RX ORDER — TRISODIUM CITRATE DIHYDRATE AND CITRIC ACID MONOHYDRATE 500; 334 MG/5ML; MG/5ML
30 SOLUTION ORAL ONCE
Refills: 0 | Status: DISCONTINUED | OUTPATIENT
Start: 2024-08-02 | End: 2024-08-02

## 2024-08-02 RX ORDER — DEXTROSE MONOHYDRATE, SODIUM CHLORIDE, SODIUM LACTATE, CALCIUM CHLORIDE, MAGNESIUM CHLORIDE 1.5; 538; 448; 18.4; 5.08 G/100ML; MG/100ML; MG/100ML; MG/100ML; MG/100ML
1000 SOLUTION INTRAPERITONEAL
Refills: 0 | Status: DISCONTINUED | OUTPATIENT
Start: 2024-08-02 | End: 2024-08-02

## 2024-08-02 RX ORDER — DEXTROSE MONOHYDRATE, SODIUM CHLORIDE, SODIUM LACTATE, CALCIUM CHLORIDE, MAGNESIUM CHLORIDE 1.5; 538; 448; 18.4; 5.08 G/100ML; MG/100ML; MG/100ML; MG/100ML; MG/100ML
1000 SOLUTION INTRAPERITONEAL
Refills: 0 | Status: DISCONTINUED | OUTPATIENT
Start: 2024-08-02 | End: 2024-08-04

## 2024-08-02 RX ORDER — ONDANSETRON HCL/PF 4 MG/2 ML
4 VIAL (ML) INJECTION EVERY 6 HOURS
Refills: 0 | Status: DISCONTINUED | OUTPATIENT
Start: 2024-08-02 | End: 2024-08-02

## 2024-08-02 RX ORDER — MAGNESIUM HYDROXIDE 400 MG/5ML
30 SUSPENSION, ORAL (FINAL DOSE FORM) ORAL
Refills: 0 | Status: DISCONTINUED | OUTPATIENT
Start: 2024-08-02 | End: 2024-08-04

## 2024-08-02 RX ORDER — CHLORHEXIDINE GLUCONATE 500 MG/1
1 CLOTH TOPICAL DAILY
Refills: 0 | Status: DISCONTINUED | OUTPATIENT
Start: 2024-08-02 | End: 2024-08-02

## 2024-08-02 RX ORDER — ACETAMINOPHEN 500 MG
975 TABLET ORAL
Refills: 0 | Status: DISCONTINUED | OUTPATIENT
Start: 2024-08-02 | End: 2024-08-04

## 2024-08-02 RX ORDER — OXYCODONE HYDROCHLORIDE 30 MG/1
5 TABLET ORAL
Refills: 0 | Status: DISCONTINUED | OUTPATIENT
Start: 2024-08-02 | End: 2024-08-04

## 2024-08-02 RX ORDER — OXYTOCIN/RINGER'S LACTATE 20/1000 ML
333.33 PLASTIC BAG, INJECTION (ML) INTRAVENOUS
Qty: 20 | Refills: 0 | Status: DISCONTINUED | OUTPATIENT
Start: 2024-08-02 | End: 2024-08-04

## 2024-08-02 RX ORDER — DIPHENHYDRAMINE HCL 25 MG
25 CAPSULE ORAL EVERY 6 HOURS
Refills: 0 | Status: DISCONTINUED | OUTPATIENT
Start: 2024-08-02 | End: 2024-08-04

## 2024-08-02 RX ORDER — OXYCODONE HYDROCHLORIDE 30 MG/1
5 TABLET ORAL ONCE
Refills: 0 | Status: DISCONTINUED | OUTPATIENT
Start: 2024-08-02 | End: 2024-08-04

## 2024-08-02 RX ORDER — FAMOTIDINE 40 MG/1
20 TABLET, FILM COATED ORAL ONCE
Refills: 0 | Status: DISCONTINUED | OUTPATIENT
Start: 2024-08-02 | End: 2024-08-02

## 2024-08-02 RX ORDER — CRANBERRY FRUIT EXTRACT 650 MG
1 CAPSULE ORAL DAILY
Refills: 0 | Status: DISCONTINUED | OUTPATIENT
Start: 2024-08-02 | End: 2024-08-04

## 2024-08-02 RX ORDER — ACETAMINOPHEN 500 MG
1000 TABLET ORAL ONCE
Refills: 0 | Status: COMPLETED | OUTPATIENT
Start: 2024-08-02 | End: 2024-08-02

## 2024-08-02 RX ORDER — KETOROLAC TROMETHAMINE 10 MG
30 TABLET ORAL EVERY 6 HOURS
Refills: 0 | Status: DISCONTINUED | OUTPATIENT
Start: 2024-08-02 | End: 2024-08-03

## 2024-08-02 RX ORDER — OXYTOCIN/RINGER'S LACTATE 20/1000 ML
333.33 PLASTIC BAG, INJECTION (ML) INTRAVENOUS
Qty: 20 | Refills: 0 | Status: DISCONTINUED | OUTPATIENT
Start: 2024-08-02 | End: 2024-08-02

## 2024-08-02 RX ORDER — NALOXONE HYDROCHLORIDE 0.4 MG/ML
0.1 INJECTION, SOLUTION INTRAMUSCULAR; INTRAVENOUS; SUBCUTANEOUS
Refills: 0 | Status: DISCONTINUED | OUTPATIENT
Start: 2024-08-02 | End: 2024-08-02

## 2024-08-02 RX ORDER — VALACYCLOVIR 500 MG/1
1000 TABLET, FILM COATED ORAL EVERY 12 HOURS
Refills: 0 | Status: DISCONTINUED | OUTPATIENT
Start: 2024-08-02 | End: 2024-08-04

## 2024-08-02 RX ORDER — LANOLIN 100 %
1 OINTMENT (GRAM) TOPICAL EVERY 6 HOURS
Refills: 0 | Status: DISCONTINUED | OUTPATIENT
Start: 2024-08-02 | End: 2024-08-04

## 2024-08-02 RX ORDER — IBUPROFEN 200 MG
600 TABLET ORAL EVERY 6 HOURS
Refills: 0 | Status: COMPLETED | OUTPATIENT
Start: 2024-08-02 | End: 2025-07-01

## 2024-08-02 RX ORDER — AZITHROMYCIN 250 MG
500 TABLET ORAL ONCE
Refills: 0 | Status: DISCONTINUED | OUTPATIENT
Start: 2024-08-02 | End: 2024-08-02

## 2024-08-02 RX ORDER — SIMETHICONE 125 MG/1
80 TABLET, CHEWABLE ORAL EVERY 4 HOURS
Refills: 0 | Status: DISCONTINUED | OUTPATIENT
Start: 2024-08-02 | End: 2024-08-04

## 2024-08-02 RX ORDER — CEFAZOLIN SODIUM 10 G
2000 VIAL (EA) INJECTION ONCE
Refills: 0 | Status: DISCONTINUED | OUTPATIENT
Start: 2024-08-02 | End: 2024-08-02

## 2024-08-02 RX ADMIN — Medication 400 MILLIGRAM(S): at 08:20

## 2024-08-02 RX ADMIN — Medication 2000 MILLIGRAM(S): at 05:30

## 2024-08-02 RX ADMIN — Medication 975 MILLIGRAM(S): at 17:57

## 2024-08-02 RX ADMIN — Medication 255 MILLIGRAM(S): at 05:40

## 2024-08-02 RX ADMIN — TRISODIUM CITRATE DIHYDRATE AND CITRIC ACID MONOHYDRATE 30 MILLILITER(S): 500; 334 SOLUTION ORAL at 05:30

## 2024-08-02 RX ADMIN — Medication 30 MILLIGRAM(S): at 08:59

## 2024-08-02 RX ADMIN — VALACYCLOVIR 1000 MILLIGRAM(S): 500 TABLET, FILM COATED ORAL at 15:51

## 2024-08-02 RX ADMIN — DEXTROSE MONOHYDRATE, SODIUM CHLORIDE, SODIUM LACTATE, CALCIUM CHLORIDE, MAGNESIUM CHLORIDE 125 MILLILITER(S): 1.5; 538; 448; 18.4; 5.08 SOLUTION INTRAPERITONEAL at 15:53

## 2024-08-02 RX ADMIN — Medication 30 MILLIGRAM(S): at 15:00

## 2024-08-02 RX ADMIN — Medication 975 MILLIGRAM(S): at 18:13

## 2024-08-02 RX ADMIN — Medication 30 MILLIGRAM(S): at 09:59

## 2024-08-02 RX ADMIN — Medication 30 MILLIGRAM(S): at 20:55

## 2024-08-02 RX ADMIN — FAMOTIDINE 20 MILLIGRAM(S): 40 TABLET, FILM COATED ORAL at 05:30

## 2024-08-02 RX ADMIN — SIMETHICONE 80 MILLIGRAM(S): 125 TABLET, CHEWABLE ORAL at 20:55

## 2024-08-02 RX ADMIN — ENOXAPARIN SODIUM 40 MILLIGRAM(S): 120 INJECTION SUBCUTANEOUS at 18:17

## 2024-08-02 RX ADMIN — Medication 30 MILLIGRAM(S): at 14:10

## 2024-08-02 NOTE — LACTATION INITIAL EVALUATION - NS LACT CON REASON FOR REQ
Dyad seen around 8 hours of life- baby is very sleepy, parents report one good feeding in the recovery room, and baby has not nursed since.  The importance of skin to skin holding was discussed, and strategies to rouse baby for feeding were demonstrated.  I showed parents how to hand express colostrum into a medicine cup and provide syringes of colostrum to baby as she sucks on mother's finger.  Baby was given 2cc of expressed colostrum, and then was wakeful enough to nurse.  Baby was placed in a football hold on the left side, where she latched deeply and developed a strong, rhythmic suck, with 1-2 swallows over approxmiately 3-4 minutes, but fires easily.  Parents were shown how to burp baby to rouse her between sides.  Parents will continue to offer the breast every 2-3 hours, and provide hand expressed colostrum for any unsuccessful/unsustained nursing sessions until baby is wakeful enough to nurse exclusively.  Mother's colostrum is abundant, and we saved the remaining 5 syringes (5ccs) for the next feeding time.  My plan of care and asssessment were discussed with the bedside RN. Telelactation referral was placed./primaparous mom

## 2024-08-02 NOTE — OB PROVIDER H&P - NSLOWPPHRISK_OBGYN_A_OB
No previous uterine incision/[place] Pregnancy/Less than or equal to 4 previous vaginal births/No known bleeding disorder/No history of postpartum hemorrhage/No other PPH risks indicated

## 2024-08-02 NOTE — OB RN DELIVERY SUMMARY - NS_CORDBLDGASA_OBGYN_ALL_OB
Patient Weight (Optional But Required For Cumulative Dose-Numbers And Decimals Only): 58 Venous blood only

## 2024-08-02 NOTE — OB PROVIDER DELIVERY SUMMARY - NSSELHIDDEN_OBGYN_ALL_OB_FT
[NS_DeliveryAttending1_OBGYN_ALL_OB_FT:[id number]],[NS_DeliveryAssist1_OBGYN_ALL_OB_FT:[id number]],[NS_DeliveryRN_OBGYN_ALL_OB_FT:[id number]]

## 2024-08-02 NOTE — OB PROVIDER DELIVERY SUMMARY - NSPROVIDERDELIVERYNOTE_OBGYN_ALL_OB_FT
Primary CS for breech, PPROM. Uncomplicated delivery. A small 2cm subserosal fibroid noted on posterior wall of uterus near the fundus.

## 2024-08-02 NOTE — LACTATION INITIAL EVALUATION - LACTATION INTERVENTIONS
initiate/review safe skin-to-skin/initiate/review hand expression/initiate/review techniques for position and latch/post discharge community resources provided/initiate/review supplementation plan due to medical indications/review techniques to manage sore nipples/engorgement/initiate/review finger suck/initiate/review breast massage/compression/reviewed components of an effective feeding and at least 8 effective feedings per day required/reviewed importance of monitoring infant diapers, the breastfeeding log, and minimum output each day/reviewed strategies to transition to breastfeeding only/reviewed benefits and recommendations for rooming in/reviewed feeding on demand/by cue at least 8 times a day/reviewed indications of inadequate milk transfer that would require supplementation

## 2024-08-02 NOTE — OB RN PATIENT PROFILE - THE IMPORTANCE OF THE NEWBORN'S COMFORT AND THERMOREGULATION DURING SKIN TO SKIN: ANY PART OF INFANT SKIN NOT TOUCHING PARENT'S SKIN IS TO BE COVERED BY A BLANKET.
Reports improvement. Currently on venting PEG and Simethicone q8h.  Continue current regimen. Statement Selected

## 2024-08-02 NOTE — OB RN DELIVERY SUMMARY - NSSELHIDDEN_OBGYN_ALL_OB_FT
[NS_DeliveryAttending1_OBGYN_ALL_OB_FT:[id number]],[NS_DeliveryAssist1_OBGYN_ALL_OB_FT:[id number]],[NS_DeliveryRN_OBGYN_ALL_OB_FT:[id number]]

## 2024-08-02 NOTE — OB PROVIDER DELIVERY SUMMARY - NSURGENCYA_OBGYN_ALL_OB
[facility]    [address] [place] [address]    Phone:  [phone]    Fax:  [phone]       Thank You for choosing us for your health care visit. We are glad to serve you and happy to provide you with this summary of your visit. Please help us to ensure we have accurate records. If you find anything that needs to be changed, please let our staff know as soon as possible.          Your Demographic Information     Patient Name Sex Daria Wayne Female 1938       Ethnic Group Patient Race    Not of  or  Origin White      Your Visit Details     Date & Time Provider Department    3/23/2017 1:00 PM Jaimee Cross MD [facility]      Your Upcoming Appointment*(Max 10)     2017  8:20 AM CDT   Lab Visit with [facility]   [facility] ([facility])    [address] [place] [address] [phone]            2017 11:15 AM CDT   Office Visit with Jaimee Cross MD   [facility] ([facility])    [address] [place] [address] [phone]            2018 10:30 AM CDT   Medicare Well Visit with Jaimee Cross MD   [facility] ([facility])    [address] [place] [address] [phone]              Your To Do List     Future Orders Please Complete On or Around Expires    GLYCOHEMOGLOBIN  Mar 23, 2017 2017    LIPID PANEL WITH REFLEX  Mar 23, 2017 (Approximate) 2017    MICROALBUMIN URINE RANDOM  Mar 23, 2017 2017    SGPT  Mar 23, 2017 (Approximate) 2017    Follow-Up    Return in about 1 year (around 3/23/2018) for Medicare Wellness Visit.      We Ordered or Performed the Following     PNEUMOCOCCAL POLYSACCHARIDE,ADULT,SQ OR IM VACCINE       Conditions Discussed Today or Order-Related Diagnoses        Comments    Need  for vaccination    -  Primary     Other hyperlipidemia         Type 2 diabetes mellitus without complication, without long-term current use of insulin           Your Vitals Were     BP Pulse Height Weight BMI Smoking Status    130/78 80 5' 11\" (1.803 m) 220 lb (99.8 kg) 30.68 kg/m2 Former Smoker      Medications Prescribed or Re-Ordered Today     clotrimazole-betamethasone (LOTRISONE) 1-0.05 % cream    Sig: Bid to AA    Class: Eprescribe    Pharmacy: [address] [place] [address] Ph #: [phone]    glimepiride (AMARYL) 1 MG tablet    Sig - Route: Take 1 tablet by mouth daily (before breakfast). - Oral    Class: Eprescribe    Pharmacy: [address] [place] [address] Ph #: [phone]    simvastatin (ZOCOR) 40 MG tablet    Sig - Route: Take 1 tablet by mouth nightly. - Oral    Class: Eprescribe    [facility] [address] Ph #: [phone]    metformin (GLUCOPHAGE) 1000 MG tablet    Si po BID    Class: Eprescrib    Pharmacy: [address] [place] [address] Ph #: [phone]    Notes to Pharmacy: Pt will call when due    citalopram (CELEXA) 20 MG tablet    Sig - Route: Take 1 tablet by mouth daily. - Oral    Class: Eprescribe    [facility] [address] Ph #: [phone]      Your Current Medications Are        Disp Refills Start End    clotrimazole-betamethasone (LOTRISONE) 1-0.05 % cream 30 g 3 3/23/2017     Sig: Bid to AA    Class: Eprescribe    glimepiride (AMARYL) 1 MG tablet 90 tablet 3 3/23/2017     Sig - Route: Take 1 tablet by mouth daily (before breakfast). - Oral    Class: Eprescribe    simvastatin (ZOCOR) 40 MG tablet 90 tablet 3 3/23/2017     Sig - Route: Take 1 tablet by mouth nightly. - Oral    Class: Eprescribe    metformin (GLUCOPHAGE) 1000 MG tablet 180 tablet 3 3/23/2017     Si po BID    Class: Eprescribe    Notes to Pharmacy: Pt will call when due    ACCU-CHEK RAJIV PLUS test strip 50  each 11 1/31/2017     Sig: TEST ONCE DAILY    Class: Eprescribe    Cosign for Ordering: Accepted by Jaimee Cross MD on 2/1/2017 10:28 AM    Lancets (ACCU-CHEK MULTICLIX) Misc 100 each 12 9/15/2015 9/15/2018    Sig: Q day and  prn    Class: Eprescribe    Calcium 600 MG tablet        Sig - Route: Take 1 tablet by mouth daily. - Oral    Class: Historical Med    fish oil-omega-3 fatty acids 1000 MG CAPS        Sig - Route: Take  by mouth. - Oral    Class: Historical Med    diphenhydrAMINE (BENADRYL) 50 MG capsule        Sig - Route: Take 50 mg by mouth every 6 hours as needed.   - Oral    Class: Historical Med    Multiple Vitamins-Minerals (EYE VITAMINS) CAPS        Sig - Route: Take  by mouth.   - Oral    Class: Historical Med    cyanocobalamin 1000 MCG tablet        Sig - Route: Take 2 tablets by mouth daily. - Oral    Class: Historical Med    citalopram (CELEXA) 20 MG tablet 30 tablet 12 3/23/2017     Sig - Route: Take 1 tablet by mouth daily. - Oral    Class: Eprescribe    Cholecalciferol 1000 UNITS tablet        Sig - Route: Take 1 tablet by mouth daily. - Oral    Class: Historical Med      Discontinued Medications        Reason for Discontinue    meloxicam (MOBIC) 7.5 MG tablet Therapy Completed      Allergies     Clindamycin       Immunizations History as of 3/23/2017     Name Date    Influenza 9/15/2016, 9/22/2015, 10/8/2011, 10/2/2010, 10/14/2008    Pneumococcal Conjugate 13 Valent 12/18/2014    Tdap 3/12/2015      Problem List as of 3/23/2017     Diabetes mellitus    Other and unspecified hyperlipidemia    Obesity, unspecified    Menopausal and postmenopausal disorder    Lesion of ulnar nerve    Carpal tunnel syndrome    Pain in limb    HLD (hyperlipidemia)    Swelling of joint, wrist, left    Bilateral hearing loss    Tendinitis of right wrist, ECU    Arthritis of wrist, left, degenerative      Patient Portal Signup     Manage health care for you and your family anytime, anywhere with the new my[facility], your  free online resource for quick and easy access to personal health information, scheduling appointments, refilling prescriptions, viewing test results, paying bills and more.  Sign up for a free and secure account. Please follow the instructions below to securely complete your enrollment.     1. Go to [email or web address]  2. Click Sign Up Now   3. Enter the Activation Code when prompted     Activation Code: Activation code not generated  Current myAurora Status: Account disabled    If you have questions related to myAurora, you can email [email or web address] or call [phone] to talk to our my[facility] staff.  For questions related to your health, contact your physician’s office.  Please remember to dial 911 for medical emergencies.                Patient Instructions      Medicare Wellness Visit  Plan for Preventive Care    A good way for you to stay healthy is to use preventive care.  Medicare covers many services that can help you stay healthy.* The goal of these services is to find any health problems as quickly as possible. Finding problems early can help make them easier to treat.  Your personal plan below lists the services you may need and when they are due.     Health Maintenance Summary     Topic Due On Due Status Completed On    Osteoporosis Screening  Completed Jun 12, 2010    Immunization-Zoster  Completed Jan 2, 2009    Immunization - Pneumococcal Sep 17, 2005 Overdue     Diabetes Eye Exam Sep 17, 1958 Overdue     Glycohemoglobin A1C  (Diabetes Sugar)  Mar 20, 2017 Due On Sep 20, 2016    Microalbumin  (Diabetes Urine Test)  Sep 10, 2016 Overdue Sep 10, 2015    GFR  (Kidney Function Test)  Sep 20, 2017 Not Due Sep 20, 2016    Diabetes Foot Exam  Sep 17, 1958 Overdue     Medicare Wellness Visit Mar 15, 2017 Due On Mar 15, 2016    IMMUNIZATION - DTaP/Tdap/Td Mar 12, 2025 Not Due Mar 12, 2015    Immunization-Influenza  Completed Sep 15, 2016           Preventive Care for Women and Men    Heart  Screenings (Cardiovascular):  · Blood tests are used to check your cholesterol, lipid and triglyceride levels. High levels can increase your risk for heart disease and stroke. High levels can be treated with medications, diet and exercise. Lowering your levels can help keep your heart and blood vessels healthy.  Your provider will order these tests if they are needed.    · An ultrasound is done to see if you have an abdominal aortic aneurysm (AAA).  This is an enlargement of one of the main blood vessels that delivers blood to the body.   In the [place], 9,000 deaths are caused by AAA.  You may not even know you have this problem and as many as 1 in 3 people will have a serious problem if it is not treated.  Early diagnosis allows for more effective treatment and cure.  If you have a family history of AAA or are a male age 65-75 who has smoked, you are at higher risk of an AAA.  Your provider can order this test, if needed.    Colorectal Screening:  · There are many tests that are used to check for cancer of your colon and rectum. You and your provider should discuss what test is best for you and when to have it done.  Options include:  · Screening Colonoscopy: exam of the entire colon, seen through a flexible lighted tube.  · Flexible Sigmoidoscopy: exam of the last third (sigmoid portion) of the colon and rectum, seen through a flexible lighted tube.  · Cologuard DNA stool test: a sample of your stool is used to screen for cancer and unseen blood in your stool.  · Fecal Occult Blood Test: a sample of your stool is studied to find any unseen blood    Flu Shot:  · An immunization that helps to prevent influenza (the flu). You should get this every year. The best time to get the shot is in the fall.    Pneumococcal Shot:  • Vaccines are available that can help prevent pneumococcal disease, which is any type of infection caused by Streptococcus pneumoniae bacteria.   Their use can prevent some cases of  pneumonia, meningitis, and sepsis. There are two types of pneumococcal vaccines:   o Conjugate vaccines (PCV-13 or Prevnar 13®) - helps protect against the 13 types of pneumococcal bacteria that are the most common causes of serious infections in children and adults.    o Polysaccharide vaccine (PPSV23 or Imbxrhluw36®) - helps protect against 23 types of pneumococcal bacteria for patients who are recommended to get it.  These vaccines should be given at least 12 months apart.  A booster is usually not needed.         Hepatitis B Shot:  · An immunization that helps to protect people from getting Hepatitis B. Hepatitis B is a virus that spreads through contact with infected blood or body fluids. Many people with the virus do not have symptoms.  The virus can lead to serious problems, such as liver disease. Some people are at higher risk than others. Your doctor will tell you if you need this shot.     Diabetes Screening:  · A test to measure sugar (glucose) in your blood is called a fasting blood sugar. Fasting means you cannot have food or drink for at least 8 hours before the test. This test can detect diabetes long before you may notice symptoms.    Glaucoma Screening:  · Glaucoma screening is performed by your eye doctor. The test measures the fluid pressure inside your eyes to determine if you have glaucoma.     Hepatitis C Screening:  · A blood test to see if you have the hepatitis C virus.  Hepatitis C attacks the liver and is a major cause of chronic liver disease.  Medicare will cover a single screening for all adults born between 1945 & 1965, or high risk patients (people who have injected illegal drugs or people who have had blood transfusions).  High risk patients who continue to inject illegal drugs can be screened for Hepatitis C every year.    Smoking and Tobacco-Use Cessation Counseling:  · Tobacco is the single greatest cause of disease and early death in our country today. Medication and counseling  together can increase a person’s chance of quitting for good.   · Medicare covers two quitting attempts per year, with four counseling sessions per attempt (eight sessions in a 12 month period)    Preventive Screening tests for Women    Screening Mammograms and Breast Exams:  · An x-ray of your breasts to check for breast cancer before you or your doctor may be able to feel it.  If breast cancer is found early it can usually be treated with success.    Pelvic Exams and Pap Tests:  · An exam to check for cervical and vaginal cancer. A Pap test is a lab test in which cells are taken from your cervix and sent to the lab to look for signs of cervical cancer. If cancer of the cervix is found early, chances for a cure are good. Testing can generally end at age 65, or if a woman has a hysterectomy for a benign condition. Your provider may recommend more frequent testing if certain abnormal results are found.    Bone Mass Measurements:  · A painless x-ray of your bone density to see if you are at risk for a broken bone. Bone density refers to the thickness of bones or how tightly the bone tissue is packed.    Preventive Screening tests for Men    Prostate Screening:  · PSA - Prostate Cancer blood test.  Experts do not recommend routine screening of healthy men with no signs or symptoms of prostate disease.  However, men should not ignore urinary symptoms, and should discuss their family history with their doctor.    *Medicare pays for many preventive services to keep you healthy. For some of these services, you might have to pay a deductible, coinsurance, and / or copayment.  The amounts vary depending on the type of services you need and the kind of Medicare health plan you have.                    Non Scheduled Urgent

## 2024-08-02 NOTE — OB RN INTRAOPERATIVE NOTE - NS_INTPNECOMPLACE_OBGYN_ALL_OB
Bilateral legs

## 2024-08-02 NOTE — OB PROVIDER DELIVERY SUMMARY - NSPOSTOPDXA_OBGYN_ALL_OB
Assessment/Plan:  Will check her some autoimmune issues, thryoid function and vitamin D levels  Not able to add lipid profile as patient had breakfast with donut and coffee and wants to go for blood work today as has to go for work tomorrow  Strict ER precautions advised  1  Fatigue, unspecified type  -     Comprehensive metabolic panel; Future  -     TSH, 3rd generation with Free T4 reflex; Future  -     JOEL Screen w/ Reflex to Titer/Pattern; Future  -     RF Screen w/ Reflex to Titer; Future  -     Cyclic citrul peptide antibody, IgG; Future  -     Sedimentation rate, automated; Future  -     C-reactive protein; Future  -     CK (with reflex to MB); Future  -     Lyme Antibody Profile with reflex to WB; Future  -     Babesia microti antibody, IgG & Igm; Future    2  Nausea  -     Comprehensive metabolic panel; Future  -     TSH, 3rd generation with Free T4 reflex; Future  -     JOEL Screen w/ Reflex to Titer/Pattern; Future  -     RF Screen w/ Reflex to Titer; Future  -     Cyclic citrul peptide antibody, IgG; Future  -     Sedimentation rate, automated; Future  -     C-reactive protein; Future  -     CK (with reflex to MB); Future  -     Lyme Antibody Profile with reflex to WB; Future  -     Babesia microti antibody, IgG & Igm; Future    3  Myalgia  -     Comprehensive metabolic panel; Future  -     TSH, 3rd generation with Free T4 reflex; Future  -     JOEL Screen w/ Reflex to Titer/Pattern; Future  -     RF Screen w/ Reflex to Titer; Future  -     Cyclic citrul peptide antibody, IgG; Future  -     Sedimentation rate, automated; Future  -     C-reactive protein; Future  -     CK (with reflex to MB); Future  -     Lyme Antibody Profile with reflex to WB; Future  -     Babesia microti antibody, IgG & Igm; Future    4  Arthralgia, unspecified joint  -     Comprehensive metabolic panel; Future  -     TSH, 3rd generation with Free T4 reflex; Future  -     JOEL Screen w/ Reflex to Titer/Pattern;  Future  -     RF Screen w/ Reflex to Titer; Future  -     Cyclic citrul peptide antibody, IgG; Future  -     Sedimentation rate, automated; Future  -     C-reactive protein; Future  -     CK (with reflex to MB); Future  -     Lyme Antibody Profile with reflex to WB; Future  -     Babesia microti antibody, IgG & Igm; Future    5  Vitamin D insufficiency  -     Comprehensive metabolic panel; Future  -     TSH, 3rd generation with Free T4 reflex; Future  -     JOEL Screen w/ Reflex to Titer/Pattern; Future  -     RF Screen w/ Reflex to Titer; Future  -     Cyclic citrul peptide antibody, IgG; Future  -     Sedimentation rate, automated; Future  -     C-reactive protein; Future  -     CK (with reflex to MB); Future  -     Lyme Antibody Profile with reflex to WB; Future  -     Babesia microti antibody, IgG & Igm; Future  -     Vitamin D 25 hydroxy; Future    6  Acute nonintractable headache, unspecified headache type  -     Comprehensive metabolic panel; Future  -     TSH, 3rd generation with Free T4 reflex; Future  -     JOEL Screen w/ Reflex to Titer/Pattern; Future  -     RF Screen w/ Reflex to Titer; Future  -     Cyclic citrul peptide antibody, IgG; Future  -     Sedimentation rate, automated; Future  -     C-reactive protein; Future  -     CK (with reflex to MB); Future  -     Lyme Antibody Profile with reflex to WB; Future  -     Babesia microti antibody, IgG & Igm; Future  -     CBC and differential; Future    7  Shortness of breath on exertion          BMI Counseling: Body mass index is 22 32 kg/m²  Discussed the patient's BMI with her  Patient Instructions:  Supportive care discussed and advised  Advised to RTO for any worsening and no improvement  Follow up for no improvement and worsening of conditions  Patient advised and educated when to see immediate medical care  Return if symptoms worsen or fail to improve  No future appointments  Subjective:      Patient ID: Wali Mckeon is a 41 y o  female  Chief Complaint   Patient presents with    Follow-up     breanne chakraborty-short of breath--         Vitals:  BP 96/62   Pulse 84   Temp 98 2 °F (36 8 °C)   Resp 16   Ht 5' 3" (1 6 m)   Wt 57 2 kg (126 lb)   BMI 22 32 kg/m²   Wt Readings from Last 3 Encounters:   10/21/19 57 2 kg (126 lb)   10/10/19 57 6 kg (127 lb)   10/01/19 56 7 kg (125 lb)       HPI  Izabela went to ER couple of days ago for fatigue, bodyaches, joint aches, nausea and headache  CT head was negative and chest xray normal   Blood culture and urine culture is normal   Stated that having these symptoms from a month  Stated that feeling very tired and having multiple joint aches and bodyaches  Denies any tick bite and rash  Treated for sinusitis and denies any sinus symptoms now  Complaint with psychiatrist   Denies any chest pain, and dizziness  Quit smoking in 2014 and does vapoing intermittently  Denies use of street drugs and marijuana  Stated that gets SOB with exertion like when walks fast occasionally from couple of months and nothing new and did not change recently  Stated that does not have to stop any activity because of that  Denies any vision changes at this time  Denies any weakness/numbness/tingling of extremities  The following portions of the patient's history were reviewed and updated as appropriate: allergies, current medications, past family history, past medical history, past social history, past surgical history and problem list       Review of Systems   Constitutional: Positive for fatigue  Respiratory: Positive for shortness of breath  Cardiovascular: Negative  Genitourinary: Negative  Musculoskeletal: Positive for arthralgias and myalgias  Skin: Negative  Neurological: Positive for headaches  Psychiatric/Behavioral: Negative            Objective:    Social History     Tobacco Use   Smoking Status Former Smoker    Types: Cigarettes    Last attempt to quit: 2014    Years since quittin 8   Smokeless Tobacco Never Used       Allergies: Allergies   Allergen Reactions    Doxycycline Other (See Comments)     unknown         Current Outpatient Medications   Medication Sig Dispense Refill    ALPRAZolam (XANAX) 1 mg tablet Take 1 mg by mouth 3 (three) times a day as needed for anxiety      buPROPion (WELLBUTRIN XL) 300 mg 24 hr tablet Take by mouth daily       lamoTRIgine (LaMICtal) 100 mg tablet Take 1 tablet by mouth daily      methylphenidate (CONCERTA) 18 mg ER tablet Take 18 mg by mouth daily       fluticasone (FLONASE) 50 mcg/act nasal spray 2 sprays into each nostril daily (Patient not taking: Reported on 10/21/2019) 16 g 3    meclizine (ANTIVERT) 12 5 MG tablet Take 1 tablet (12 5 mg total) by mouth 3 (three) times a day as needed for dizziness (Patient not taking: Reported on 10/21/2019) 30 tablet 0    ondansetron (ZOFRAN-ODT) 4 mg disintegrating tablet Take 1 tablet (4 mg total) by mouth every 6 (six) hours as needed for nausea or vomiting (Patient not taking: Reported on 10/21/2019) 10 tablet 0     No current facility-administered medications for this visit  Physical Exam   Constitutional: She is oriented to person, place, and time  She appears well-developed and well-nourished  Cardiovascular: Normal rate, regular rhythm and normal heart sounds  Pulmonary/Chest: Effort normal and breath sounds normal    Musculoskeletal: Normal range of motion  She exhibits no edema, tenderness or deformity  Neurological: She is alert and oriented to person, place, and time  Skin: Skin is warm and dry  Psychiatric: She has a normal mood and affect   Her behavior is normal  Judgment and thought content normal                    ROSE Espinal Same as Pre-Op Diagnosis

## 2024-08-02 NOTE — OB PROVIDER H&P - ASSESSMENT
32 yo G!P0 at 39w0d presenting with continuous leakage of clear fluids from the vagina    - PROM likely with positive nitrazine and ferning   - Cephalic presentation confirmed   - Admit to L&D  - Consent signed for  for breech  - Previous meal at 2100   - NPO    Discussed with Kadie Fitzpatrick, PGY2 and attending Marcello Darden PGY1 34 yo G!P0 at 39w0d presenting with continuous leakage of clear fluids from the vagina    - PROM likely with positive nitrazine and ferning    - Admit to L&D  - Consent signed for  for breech  - Previous meal at 2100   - NPO    Discussed with Kadie Fitzpatrick, PGY2 and attending Marcello Darden PGY1

## 2024-08-02 NOTE — OB PROVIDER H&P - HISTORY OF PRESENT ILLNESS
32yo  at 39w0d presenting with continuous leakage starting 02:00 AM. Per patient, she initially had thick discharge which later developed into ongoing watery clear discharge. - VB - CTX +FM    Ante: IVF pregnancy, own egg. PGT, NIPT and anatomy scan wnl. Passed GCT. Of note, patient presented to triage on  after an elevate BP reading. She was normotensive in triage, PEC labs wnl. Normotensive today in triage.   EFW 2595 ()   GBS negative    OBHX: G1  GynHX: denies fibroids, ovarian cysts, abnormal pap smear, STI/herpes.   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

## 2024-08-02 NOTE — OB RN INTRAOPERATIVE NOTE - NSSELHIDDEN_OBGYN_ALL_OB_FT
[NS_DeliveryAttending1_OBGYN_ALL_OB_FT:[id number]],[NS_DeliveryAssist1_OBGYN_ALL_OB_FT:[id number]],[NS_DeliveryRN_OBGYN_ALL_OB_FT:[id number]]

## 2024-08-03 ENCOUNTER — TRANSCRIPTION ENCOUNTER (OUTPATIENT)
Age: 34
End: 2024-08-03

## 2024-08-03 LAB
BASOPHILS # BLD AUTO: 0.03 K/UL — SIGNIFICANT CHANGE UP (ref 0–0.2)
BASOPHILS NFR BLD AUTO: 0.3 % — SIGNIFICANT CHANGE UP (ref 0–2)
EOSINOPHIL # BLD AUTO: 0.03 K/UL — SIGNIFICANT CHANGE UP (ref 0–0.5)
EOSINOPHIL NFR BLD AUTO: 0.3 % — SIGNIFICANT CHANGE UP (ref 0–6)
HCT VFR BLD CALC: 31.7 % — LOW (ref 34.5–45)
HGB BLD-MCNC: 10.8 G/DL — LOW (ref 11.5–15.5)
IMM GRANULOCYTES NFR BLD AUTO: 0.4 % — SIGNIFICANT CHANGE UP (ref 0–0.9)
LYMPHOCYTES # BLD AUTO: 1.68 K/UL — SIGNIFICANT CHANGE UP (ref 1–3.3)
LYMPHOCYTES # BLD AUTO: 14.4 % — SIGNIFICANT CHANGE UP (ref 13–44)
MCHC RBC-ENTMCNC: 30.3 PG — SIGNIFICANT CHANGE UP (ref 27–34)
MCHC RBC-ENTMCNC: 34.1 GM/DL — SIGNIFICANT CHANGE UP (ref 32–36)
MCV RBC AUTO: 88.8 FL — SIGNIFICANT CHANGE UP (ref 80–100)
MONOCYTES # BLD AUTO: 0.87 K/UL — SIGNIFICANT CHANGE UP (ref 0–0.9)
MONOCYTES NFR BLD AUTO: 7.5 % — SIGNIFICANT CHANGE UP (ref 2–14)
NEUTROPHILS # BLD AUTO: 8.97 K/UL — HIGH (ref 1.8–7.4)
NEUTROPHILS NFR BLD AUTO: 77.1 % — HIGH (ref 43–77)
NRBC # BLD: 0 /100 WBCS — SIGNIFICANT CHANGE UP (ref 0–0)
PLATELET # BLD AUTO: 204 K/UL — SIGNIFICANT CHANGE UP (ref 150–400)
RBC # BLD: 3.57 M/UL — LOW (ref 3.8–5.2)
RBC # FLD: 13.4 % — SIGNIFICANT CHANGE UP (ref 10.3–14.5)
WBC # BLD: 11.63 K/UL — HIGH (ref 3.8–10.5)
WBC # FLD AUTO: 11.63 K/UL — HIGH (ref 3.8–10.5)

## 2024-08-03 RX ORDER — PETROLATUM 87.32 G/118G
1 OINTMENT TOPICAL THREE TIMES A DAY
Refills: 0 | Status: DISCONTINUED | OUTPATIENT
Start: 2024-08-03 | End: 2024-08-04

## 2024-08-03 RX ORDER — IBUPROFEN 200 MG
600 TABLET ORAL EVERY 6 HOURS
Refills: 0 | Status: DISCONTINUED | OUTPATIENT
Start: 2024-08-03 | End: 2024-08-04

## 2024-08-03 RX ADMIN — VALACYCLOVIR 1000 MILLIGRAM(S): 500 TABLET, FILM COATED ORAL at 06:36

## 2024-08-03 RX ADMIN — Medication 975 MILLIGRAM(S): at 11:16

## 2024-08-03 RX ADMIN — PETROLATUM 1 APPLICATION(S): 87.32 OINTMENT TOPICAL at 21:22

## 2024-08-03 RX ADMIN — SIMETHICONE 80 MILLIGRAM(S): 125 TABLET, CHEWABLE ORAL at 03:12

## 2024-08-03 RX ADMIN — Medication 30 MILLIGRAM(S): at 03:12

## 2024-08-03 RX ADMIN — Medication 600 MILLIGRAM(S): at 14:31

## 2024-08-03 RX ADMIN — Medication 975 MILLIGRAM(S): at 00:26

## 2024-08-03 RX ADMIN — Medication 975 MILLIGRAM(S): at 06:35

## 2024-08-03 RX ADMIN — Medication 975 MILLIGRAM(S): at 17:13

## 2024-08-03 RX ADMIN — ENOXAPARIN SODIUM 40 MILLIGRAM(S): 120 INJECTION SUBCUTANEOUS at 17:12

## 2024-08-03 RX ADMIN — Medication 975 MILLIGRAM(S): at 23:50

## 2024-08-03 RX ADMIN — Medication 30 MILLILITER(S): at 16:26

## 2024-08-03 RX ADMIN — SIMETHICONE 80 MILLIGRAM(S): 125 TABLET, CHEWABLE ORAL at 22:45

## 2024-08-03 RX ADMIN — Medication 1 APPLICATION(S): at 22:45

## 2024-08-03 RX ADMIN — Medication 600 MILLIGRAM(S): at 08:10

## 2024-08-03 RX ADMIN — SIMETHICONE 80 MILLIGRAM(S): 125 TABLET, CHEWABLE ORAL at 16:26

## 2024-08-03 RX ADMIN — VALACYCLOVIR 1000 MILLIGRAM(S): 500 TABLET, FILM COATED ORAL at 17:13

## 2024-08-03 RX ADMIN — Medication 600 MILLIGRAM(S): at 20:49

## 2024-08-03 NOTE — DISCHARGE NOTE OB - HOSPITAL COURSE
Pt is a 33yF s/p c-sx.  Please see delivery note for details.  During postpartum course patient's vitals were stable, vaginal bleeding appropriate, and pain well controlled.  On day of discharge patient was ambulating, pt had adequate oral intake, and was voiding freely.  Discharge instructions and precautions were given.  Will return to clinic in 1-2 weeks for incision check.

## 2024-08-03 NOTE — PROGRESS NOTE ADULT - SUBJECTIVE AND OBJECTIVE BOX
Patient evaluated at bedside this morning, resting comfortable in bed, with no acute events overnight.  She reports pain is well controlled with oral pain medications.   She denies headache, dizziness, chest pain, palpitations, shortness of breath, nausea, vomiting or heavy vaginal bleeding.  She is ambulating and voiding.    Physical Exam:  Vital Signs Last 24 Hrs  T(C): 36.3 (03 Aug 2024 02:25), Max: 36.8 (02 Aug 2024 18:08)  T(F): 97.3 (03 Aug 2024 02:25), Max: 98.2 (02 Aug 2024 18:08)  HR: 67 (03 Aug 2024 02:25) (67 - 98)  BP: 100/61 (03 Aug 2024 02:25) (99/61 - 121/71)  BP(mean): 72 (03 Aug 2024 02:25) (72 - 87)  RR: 17 (03 Aug 2024 02:25) (16 - 17)  SpO2: 97% (03 Aug 2024 02:25) (95% - 97%)    Parameters below as of 03 Aug 2024 02:25  Patient On (Oxygen Delivery Method): room air        GA: NAD, A+0 x 3  Pulm: no increased WOB  Abd: soft, nontender, nondistended, no rebound or guarding, incision clean, dry and intact, uterus firm at midline, 2 fb below umbilicus  : voiding  Extremities: no swelling or calf tenderness, SCDs in place                            12.6   13.15 )-----------( 219      ( 02 Aug 2024 04:54 )             36.7               acetaminophen     Tablet .. 975 milliGRAM(s) Oral <User Schedule>  diphenhydrAMINE 25 milliGRAM(s) Oral every 6 hours PRN  diphtheria/tetanus/pertussis (acellular) Vaccine (Adacel) 0.5 milliLiter(s) IntraMuscular once  enoxaparin Injectable 40 milliGRAM(s) SubCutaneous every 24 hours  ibuprofen  Tablet. 600 milliGRAM(s) Oral every 6 hours  lactated ringers. 1000 milliLiter(s) IV Continuous <Continuous>  lanolin Ointment 1 Application(s) Topical every 6 hours PRN  magnesium hydroxide Suspension 30 milliLiter(s) Oral two times a day PRN  oxyCODONE    IR 5 milliGRAM(s) Oral every 3 hours PRN  oxyCODONE    IR 5 milliGRAM(s) Oral once PRN  oxytocin Infusion 333.333 milliUNIT(s)/Min IV Continuous <Continuous>  prenatal multivitamin 1 Tablet(s) Oral daily  simethicone 80 milliGRAM(s) Chew every 4 hours PRN  valACYclovir 1000 milliGRAM(s) Oral every 12 hours

## 2024-08-03 NOTE — DISCHARGE NOTE OB - PATIENT PORTAL LINK FT
You can access the FollowMyHealth Patient Portal offered by [facility] by registering at the following website: [email or web address]. By joining [employer]’s FollowMyHealth portal, you will also be able to view your health information using other applications (apps) compatible with our system.

## 2024-08-03 NOTE — DISCHARGE NOTE OB - NS MD DC FALL RISK RISK
For information on Fall & Injury Prevention, visit: [email or web address] OR  [email or web address] OR  https://www.cdc.gov/steadi/patient.html

## 2024-08-03 NOTE — DISCHARGE NOTE OB - CARE PROVIDER_API CALL
Silvana Miller  Obstetrics and Gynecology  [address] [place] [address], [address]  [place] [address]  Phone: [phone]  Fax: [phone]  Follow Up Time:

## 2024-08-03 NOTE — PROGRESS NOTE ADULT - ASSESSMENT
33y Female POD#1  s/p C/S, Uncomplicated                                       - Neuro/Pain:  toradol atc, tylenol atc, oxy prn  - CV:  VS per routine, AM CBC pending  - Pulm: Encourage ISS & Ambulation  - GI: Advance as tolerated  - : voiding  - DVT ppx: SCDs, Lovenox 40mg QD  - Dispo: POD #2/3

## 2024-08-04 VITALS
SYSTOLIC BLOOD PRESSURE: 104 MMHG | TEMPERATURE: 98 F | HEART RATE: 68 BPM | DIASTOLIC BLOOD PRESSURE: 66 MMHG | RESPIRATION RATE: 18 BRPM | OXYGEN SATURATION: 97 %

## 2024-08-04 PROCEDURE — 36415 COLL VENOUS BLD VENIPUNCTURE: CPT

## 2024-08-04 PROCEDURE — 85460 HEMOGLOBIN FETAL: CPT

## 2024-08-04 PROCEDURE — 86900 BLOOD TYPING SEROLOGIC ABO: CPT

## 2024-08-04 PROCEDURE — 86780 TREPONEMA PALLIDUM: CPT

## 2024-08-04 PROCEDURE — 85025 COMPLETE CBC W/AUTO DIFF WBC: CPT

## 2024-08-04 PROCEDURE — 86870 RBC ANTIBODY IDENTIFICATION: CPT

## 2024-08-04 PROCEDURE — 86880 COOMBS TEST DIRECT: CPT

## 2024-08-04 PROCEDURE — 59050 FETAL MONITOR W/REPORT: CPT

## 2024-08-04 PROCEDURE — 86850 RBC ANTIBODY SCREEN: CPT

## 2024-08-04 PROCEDURE — 86901 BLOOD TYPING SEROLOGIC RH(D): CPT

## 2024-08-04 RX ORDER — CRANBERRY FRUIT EXTRACT 650 MG
1 CAPSULE ORAL
Qty: 0 | Refills: 0 | DISCHARGE
Start: 2024-08-04

## 2024-08-04 RX ORDER — ACETAMINOPHEN 500 MG
3 TABLET ORAL
Qty: 0 | Refills: 0 | DISCHARGE
Start: 2024-08-04

## 2024-08-04 RX ORDER — IBUPROFEN 200 MG
1 TABLET ORAL
Qty: 0 | Refills: 0 | DISCHARGE
Start: 2024-08-04

## 2024-08-04 RX ADMIN — Medication 975 MILLIGRAM(S): at 12:06

## 2024-08-04 RX ADMIN — SIMETHICONE 80 MILLIGRAM(S): 125 TABLET, CHEWABLE ORAL at 06:13

## 2024-08-04 RX ADMIN — Medication 975 MILLIGRAM(S): at 06:12

## 2024-08-04 RX ADMIN — PETROLATUM 1 APPLICATION(S): 87.32 OINTMENT TOPICAL at 06:13

## 2024-08-04 RX ADMIN — Medication 1 TABLET(S): at 12:06

## 2024-08-04 RX ADMIN — Medication 600 MILLIGRAM(S): at 02:50

## 2024-08-04 RX ADMIN — Medication 600 MILLIGRAM(S): at 08:33

## 2024-08-04 RX ADMIN — VALACYCLOVIR 1000 MILLIGRAM(S): 500 TABLET, FILM COATED ORAL at 06:13

## 2024-08-04 NOTE — PROGRESS NOTE ADULT - ASSESSMENT
A/P: 33y s/p primary  section for breech presentation and SROM, POD#2, stable  -  Pain: PO motrin q6hrs, tylenol q8hrs, oxycodone for severe pain PRN  -  Post-operatively labs: post-op Hgb 10.8, hemodynamically stable, no symptoms of acute blood loss anemia   -  GI: tolerating regular diet, passing gas  -  : s/p meade , urinating without difficulty  -  DVT prophylaxis: encouraged increased ambulation, SCDs, SQL  -  Dispo: POD 3 or 4

## 2024-08-04 NOTE — PROGRESS NOTE ADULT - SUBJECTIVE AND OBJECTIVE BOX
Patient evaluated at bedside this morning, resting comfortable in bed.   She reports pain is well controlled with oral pain medications.   She denies heavy vaginal bleeding.   She has been ambulating without assistance, voiding spontaneously, passing gas, tolerating regular diet.     Physical Exam:  Vital Signs Last 24 Hrs  T(C): 36.7 (03 Aug 2024 22:24), Max: 36.9 (03 Aug 2024 18:00)  T(F): 98 (03 Aug 2024 22:24), Max: 98.4 (03 Aug 2024 18:00)  HR: 66 (03 Aug 2024 22:24) (66 - 76)  BP: 103/68 (03 Aug 2024 22:24) (98/62 - 121/66)  BP(mean): --  RR: 18 (03 Aug 2024 22:24) (17 - 18)  SpO2: 97% (03 Aug 2024 22:24) (96% - 99%)    Parameters below as of 03 Aug 2024 14:00  Patient On (Oxygen Delivery Method): room air        GA: well-appearing, NAD  Pulm: no increased WOB  Abd: soft, nontender, no rebound or guarding, incision clean, dry and intact, uterus firm at midline,  fb below umbilicus  Extremities: no calf tenderness                             10.8   11.63 )-----------( 204      ( 03 Aug 2024 08:45 )             31.7

## 2024-08-05 ENCOUNTER — NON-APPOINTMENT (OUTPATIENT)
Age: 34
End: 2024-08-05

## 2024-08-06 PROBLEM — Z23 ENCOUNTER FOR IMMUNIZATION: Status: RESOLVED | Noted: 2022-09-13 | Resolved: 2024-08-06

## 2024-08-06 PROBLEM — Z86.19 HISTORY OF HERPES LABIALIS: Status: RESOLVED | Noted: 2022-09-13 | Resolved: 2024-08-06

## 2024-08-06 PROBLEM — Z87.19 HISTORY OF GASTROESOPHAGEAL REFLUX (GERD): Status: RESOLVED | Noted: 2022-09-13 | Resolved: 2024-08-06

## 2024-08-06 PROBLEM — Z11.3 SCREENING FOR STD (SEXUALLY TRANSMITTED DISEASE): Status: RESOLVED | Noted: 2023-10-09 | Resolved: 2024-08-06

## 2024-08-06 PROBLEM — R79.89 ELEVATED LIVER FUNCTION TESTS: Status: RESOLVED | Noted: 2024-04-11 | Resolved: 2024-08-06

## 2024-08-07 ENCOUNTER — APPOINTMENT (OUTPATIENT)
Age: 34
End: 2024-08-07

## 2024-08-07 PROCEDURE — S9443: CPT | Mod: 95

## 2024-08-08 ENCOUNTER — NON-APPOINTMENT (OUTPATIENT)
Age: 34
End: 2024-08-08

## 2024-08-09 ENCOUNTER — APPOINTMENT (OUTPATIENT)
Age: 34
End: 2024-08-09

## 2024-08-10 DIAGNOSIS — D25.2 SUBSEROSAL LEIOMYOMA OF UTERUS: ICD-10-CM

## 2024-08-10 DIAGNOSIS — O34.13 MATERNAL CARE FOR BENIGN TUMOR OF CORPUS UTERI, THIRD TRIMESTER: ICD-10-CM

## 2024-08-10 DIAGNOSIS — Z28.09 IMMUNIZATION NOT CARRIED OUT BECAUSE OF OTHER CONTRAINDICATION: ICD-10-CM

## 2024-08-10 DIAGNOSIS — B00.1 HERPESVIRAL VESICULAR DERMATITIS: ICD-10-CM

## 2024-08-10 DIAGNOSIS — Z3A.39 39 WEEKS GESTATION OF PREGNANCY: ICD-10-CM

## 2024-09-23 ENCOUNTER — APPOINTMENT (OUTPATIENT)
Dept: DERMATOLOGY | Facility: CLINIC | Age: 34
End: 2024-09-23
Payer: COMMERCIAL

## 2024-09-23 VITALS — BODY MASS INDEX: 24.8 KG/M2 | HEIGHT: 63 IN | WEIGHT: 140 LBS

## 2024-09-23 DIAGNOSIS — L81.4 OTHER MELANIN HYPERPIGMENTATION: ICD-10-CM

## 2024-09-23 DIAGNOSIS — Z12.83 ENCOUNTER FOR SCREENING FOR MALIGNANT NEOPLASM OF SKIN: ICD-10-CM

## 2024-09-23 DIAGNOSIS — D22.9 MELANOCYTIC NEVI, UNSPECIFIED: ICD-10-CM

## 2024-09-23 PROCEDURE — 99203 OFFICE O/P NEW LOW 30 MIN: CPT

## 2024-09-23 NOTE — HISTORY OF PRESENT ILLNESS
[FreeTextEntry1] : NP moles [de-identified] : NP Here for eval of moles- none new, growing, changing Requesting FBSE. No personal hx of skin cancer. Family hx of skin cancer though uncertain of type.  SH: [place] daughter,  is medicine resident at [employer]

## 2024-09-23 NOTE — HISTORY OF PRESENT ILLNESS
[FreeTextEntry1] : NP moles [de-identified] : NP Here for eval of moles- none new, growing, changing Requesting FBSE. No personal hx of skin cancer. Family hx of skin cancer though uncertain of type.  SH: [place] daughter,  is medicine resident at [employer]

## 2024-09-23 NOTE — PHYSICAL EXAM
[FreeTextEntry3] :  AAOx3, NAD, well-appearing / pleasant Total body skin exam performed within normal limits with the exception of:  - Patient deferred examination of genitalia  Fairly uniform and regular brown macules and papules on the trunk and extremities

## 2024-09-23 NOTE — ASSESSMENT
[FreeTextEntry1] : # Lentigines # Multiple melanocytic nevi, all benign appearing on today's exam -Sun protection was discussed. The proper use of broad spectrum UVB/UVA sunscreen with SPF 30 or greater was reviewed and the need for re-application after swimming or sweating or 2-3 hours was emphasized. We discussed judicious use of clothing and avoidance of peak periods of sun exposure. I made the patient aware of need for year-round protection. ABCDEs of melanoma reviewed. -Self-skin check also reviewed -Counseled pt to monitor for changes   # Screening for skin cancer -ABCDEs of melanoma, sun safety, and self-skin check reviewed -Counseled pt to notify us of any changing or concerning lesions -RTC 12 months, sooner prn

## 2024-10-16 ENCOUNTER — APPOINTMENT (OUTPATIENT)
Dept: INTERNAL MEDICINE | Facility: CLINIC | Age: 34
End: 2024-10-16
Payer: COMMERCIAL

## 2024-10-16 VITALS
BODY MASS INDEX: 24.8 KG/M2 | OXYGEN SATURATION: 96 % | SYSTOLIC BLOOD PRESSURE: 104 MMHG | DIASTOLIC BLOOD PRESSURE: 69 MMHG | WEIGHT: 140 LBS | HEART RATE: 69 BPM | HEIGHT: 63 IN

## 2024-10-16 DIAGNOSIS — Z00.00 ENCOUNTER FOR GENERAL ADULT MEDICAL EXAMINATION W/OUT ABNORMAL FINDINGS: ICD-10-CM

## 2024-10-16 DIAGNOSIS — Z23 ENCOUNTER FOR IMMUNIZATION: ICD-10-CM

## 2024-10-16 PROCEDURE — 90656 IIV3 VACC NO PRSV 0.5 ML IM: CPT

## 2024-10-16 PROCEDURE — 36415 COLL VENOUS BLD VENIPUNCTURE: CPT

## 2024-10-16 PROCEDURE — G0008: CPT

## 2024-10-16 PROCEDURE — 91320 SARSCV2 VAC 30MCG TRS-SUC IM: CPT

## 2024-10-16 PROCEDURE — 90480 ADMN SARSCOV2 VAC 1/ONLY CMP: CPT

## 2024-10-16 PROCEDURE — 99395 PREV VISIT EST AGE 18-39: CPT | Mod: 25

## 2024-10-17 ENCOUNTER — TRANSCRIPTION ENCOUNTER (OUTPATIENT)
Age: 34
End: 2024-10-17

## 2024-10-17 LAB
25(OH)D3 SERPL-MCNC: 34.2 NG/ML
ALBUMIN SERPL ELPH-MCNC: 5.1 G/DL
ALP BLD-CCNC: 57 U/L
ALT SERPL-CCNC: 21 U/L
ANION GAP SERPL CALC-SCNC: 14 MMOL/L
AST SERPL-CCNC: 24 U/L
BASOPHILS # BLD AUTO: 0.06 K/UL
BASOPHILS NFR BLD AUTO: 1 %
BILIRUB SERPL-MCNC: 0.5 MG/DL
BUN SERPL-MCNC: 14 MG/DL
CALCIUM SERPL-MCNC: 9.6 MG/DL
CHLORIDE SERPL-SCNC: 101 MMOL/L
CHOLEST SERPL-MCNC: 199 MG/DL
CO2 SERPL-SCNC: 26 MMOL/L
CREAT SERPL-MCNC: 0.93 MG/DL
EGFR: 84 ML/MIN/1.73M2
EOSINOPHIL # BLD AUTO: 0.2 K/UL
EOSINOPHIL NFR BLD AUTO: 3.5 %
ESTIMATED AVERAGE GLUCOSE: 103 MG/DL
GLUCOSE SERPL-MCNC: 104 MG/DL
HBA1C MFR BLD HPLC: 5.2 %
HCT VFR BLD CALC: 39.6 %
HDLC SERPL-MCNC: 81 MG/DL
HGB BLD-MCNC: 13.3 G/DL
IMM GRANULOCYTES NFR BLD AUTO: 0.2 %
LDLC SERPL CALC-MCNC: 106 MG/DL
LYMPHOCYTES # BLD AUTO: 1.88 K/UL
LYMPHOCYTES NFR BLD AUTO: 32.6 %
MAN DIFF?: NORMAL
MCHC RBC-ENTMCNC: 29.6 PG
MCHC RBC-ENTMCNC: 33.6 GM/DL
MCV RBC AUTO: 88.2 FL
MONOCYTES # BLD AUTO: 0.48 K/UL
MONOCYTES NFR BLD AUTO: 8.3 %
NEUTROPHILS # BLD AUTO: 3.14 K/UL
NEUTROPHILS NFR BLD AUTO: 54.4 %
NONHDLC SERPL-MCNC: 118 MG/DL
PLATELET # BLD AUTO: 325 K/UL
POTASSIUM SERPL-SCNC: 4.3 MMOL/L
PROT SERPL-MCNC: 7.7 G/DL
RBC # BLD: 4.49 M/UL
RBC # FLD: 12.7 %
SODIUM SERPL-SCNC: 141 MMOL/L
TRIGL SERPL-MCNC: 70 MG/DL
TSH SERPL-ACNC: 1.79 UIU/ML
WBC # FLD AUTO: 5.77 K/UL

## 2024-10-22 ENCOUNTER — APPOINTMENT (OUTPATIENT)
Age: 34
End: 2024-10-22
Payer: COMMERCIAL

## 2024-10-22 PROCEDURE — S9443: CPT | Mod: 95
